# Patient Record
Sex: FEMALE | Race: BLACK OR AFRICAN AMERICAN | NOT HISPANIC OR LATINO | Employment: FULL TIME | URBAN - METROPOLITAN AREA
[De-identification: names, ages, dates, MRNs, and addresses within clinical notes are randomized per-mention and may not be internally consistent; named-entity substitution may affect disease eponyms.]

---

## 2018-11-11 ENCOUNTER — HOSPITAL ENCOUNTER (OUTPATIENT)
Dept: RADIOLOGY | Facility: MEDICAL CENTER | Age: 23
End: 2018-11-11
Attending: FAMILY MEDICINE
Payer: COMMERCIAL

## 2018-11-11 ENCOUNTER — OFFICE VISIT (OUTPATIENT)
Dept: URGENT CARE | Facility: PHYSICIAN GROUP | Age: 23
End: 2018-11-11
Payer: COMMERCIAL

## 2018-11-11 VITALS
DIASTOLIC BLOOD PRESSURE: 62 MMHG | OXYGEN SATURATION: 97 % | TEMPERATURE: 98 F | HEART RATE: 80 BPM | SYSTOLIC BLOOD PRESSURE: 110 MMHG | BODY MASS INDEX: 18.78 KG/M2 | HEIGHT: 64 IN | WEIGHT: 110 LBS

## 2018-11-11 DIAGNOSIS — G43.809 OTHER MIGRAINE WITHOUT STATUS MIGRAINOSUS, NOT INTRACTABLE: ICD-10-CM

## 2018-11-11 LAB
APPEARANCE UR: CLEAR
BILIRUB UR STRIP-MCNC: NORMAL MG/DL
COLOR UR AUTO: YELLOW
GLUCOSE UR STRIP.AUTO-MCNC: NORMAL MG/DL
INT CON NEG: NORMAL
INT CON POS: NORMAL
KETONES UR STRIP.AUTO-MCNC: NORMAL MG/DL
LEUKOCYTE ESTERASE UR QL STRIP.AUTO: NORMAL
NITRITE UR QL STRIP.AUTO: NORMAL
PH UR STRIP.AUTO: 5.5 [PH] (ref 5–8)
POC URINE PREGNANCY TEST: NEGATIVE
PROT UR QL STRIP: NORMAL MG/DL
RBC UR QL AUTO: NORMAL
SP GR UR STRIP.AUTO: 1.02
UROBILINOGEN UR STRIP-MCNC: 0.2 MG/DL

## 2018-11-11 PROCEDURE — 81002 URINALYSIS NONAUTO W/O SCOPE: CPT | Performed by: FAMILY MEDICINE

## 2018-11-11 PROCEDURE — 70450 CT HEAD/BRAIN W/O DYE: CPT

## 2018-11-11 PROCEDURE — 99204 OFFICE O/P NEW MOD 45 MIN: CPT | Performed by: FAMILY MEDICINE

## 2018-11-11 PROCEDURE — 81025 URINE PREGNANCY TEST: CPT | Performed by: FAMILY MEDICINE

## 2018-11-11 RX ORDER — BUTALBITAL, ACETAMINOPHEN AND CAFFEINE 300; 40; 50 MG/1; MG/1; MG/1
1 CAPSULE ORAL EVERY 4 HOURS PRN
Qty: 20 CAP | Refills: 0 | Status: SHIPPED | OUTPATIENT
Start: 2018-11-11 | End: 2018-11-18

## 2018-11-14 NOTE — PROGRESS NOTES
"Chief Complaint   Patient presents with   • Headache     x2wks L side temple              Migraine   This is a new problem. The current episode started in the past 7 days. The problem occurs intermittently. The problem has been unchanged. The pain is located in the   left temple region. The pain does not radiate. The pain quality is similar to prior headaches. The quality of the pain is described as sharp. Associated symptoms include dizziness, nausea, phonophobia and photophobia. Pertinent negatives include no abdominal pain or fever. The symptoms are aggravated by activity and bright light. Patient has tried acetaminophen for the symptoms. The treatment provided no relief.  past medical history is significant for migraine headaches.        Past medical history was unremarkable and not pertinent to current issue  Social hx - denies tobacco, alcohol, drug use  Family hx was reviewed - no pertinent past family hx        Review of Systems   Constitutional: Negative for fever and chills.   Eyes: Positive for photophobia.   Denies blurry vision  Respiratory: Negative for shortness of breath, cough.    Cardiovascular: Negative for chest pain and palpitations.   Gastrointestinal: Positive for nausea. Negative for abdominal pain.   Skin: Negative for rash or itching    - denies dysuria, discharge.   Neurological: Positive for dizziness and headaches.   Psychiatric/Behavioral: The patient is not nervous/anxious.    10 point ROS otherwise negative, except per HPI           Objective:     Blood pressure 110/62, pulse 80, temperature 36.7 °C (98 °F), temperature source Temporal, height 1.626 m (5' 4\"), weight 49.9 kg (110 lb), SpO2 97 %.    Physical Exam   Constitutional: pt is oriented to person, place, and time.  Pt appears well-developed and well-nourished. No distress.   HENT:   Head: Normocephalic and atraumatic.   Mouth/Throat: Oropharynx is clear and moist. No oropharyngeal exudate.   Eyes: Conjunctivae and EOM are " normal. Pupils are equal, round, and reactive to light. Right eye exhibits no discharge. Left eye exhibits no discharge. No scleral icterus.   Neck: Neck supple.   Cardiovascular: Normal rate and regular rhythm.    Pulmonary/Chest: Effort normal.   Lymphadenopathy:     Pt has no cervical adenopathy.   Neurologic: Alert and oriented. Cranial nerves II-XII intact, EOMs intact, no tongue deviation, PERRL, no facial asymmetry to motor or sensation, symmetric palate, normal finger-to-nose test, no pronator drift. No focal motor deficits. Symmetric reflexes. Normal station and gait, normal tandem walk. Coordination normal.   Skin: Skin is warm. Pt is not diaphoretic. No erythema. No pallor.   Psychiatric:  behavior is normal.   Nursing note and vitals reviewed.           11/11/2018 11:21 AM    HISTORY/REASON FOR EXAM:  Severe headache    TECHNIQUE/EXAM DESCRIPTION AND NUMBER OF VIEWS:  CT of the head without contrast.    Up to date radiation dose reduction adjustments have been utilized to meet ALARA standards for radiation dose reduction.    COMPARISON: None available    FINDINGS: There is no evidence of mass or mass effect. CSF spaces are normal. There is no periventricular chronic small vessel ischemic change present. There is no intracranial hemorrhage seen. The calvarium is intact. There is no scalp injury. There is   no evidence of sinus disease.     Impression       No evidence of acute intracranial process.   Reading Provider Reading Date   Clay Burnette M.D. Nov 11, 2018   Signing Provider Signing Date Signing Time   Clay Burnette M.D. Nov 11, 2018 11:52 AM            Assessment/Plan:         1. Other migraine without status migrainosus, not intractable  CT personally reviewed.   Completely unremarkable.       Urine pregnancy negative.     - acetaminophen/caffeine/butalbital 300-40-50 mg (FIORICET) -40 MG Cap capsule; Take 1 Cap by mouth every four hours as needed for Headache for up to 7 days.  Dispense:  20 Cap; Refill: 0    Follow up in 1-2 d if no improvement

## 2019-06-25 ENCOUNTER — TELEPHONE (OUTPATIENT)
Dept: SCHEDULING | Facility: IMAGING CENTER | Age: 24
End: 2019-06-25

## 2023-09-11 ENCOUNTER — OFFICE VISIT (OUTPATIENT)
Dept: URGENT CARE | Facility: PHYSICIAN GROUP | Age: 28
End: 2023-09-11
Payer: COMMERCIAL

## 2023-09-11 VITALS
HEART RATE: 75 BPM | WEIGHT: 113 LBS | DIASTOLIC BLOOD PRESSURE: 60 MMHG | BODY MASS INDEX: 19.29 KG/M2 | RESPIRATION RATE: 18 BRPM | SYSTOLIC BLOOD PRESSURE: 114 MMHG | HEIGHT: 64 IN | TEMPERATURE: 97.1 F | OXYGEN SATURATION: 98 %

## 2023-09-11 DIAGNOSIS — J02.0 STREP PHARYNGITIS: ICD-10-CM

## 2023-09-11 DIAGNOSIS — J02.9 SORE THROAT: ICD-10-CM

## 2023-09-11 LAB — S PYO DNA SPEC NAA+PROBE: DETECTED

## 2023-09-11 PROCEDURE — 3078F DIAST BP <80 MM HG: CPT

## 2023-09-11 PROCEDURE — 3074F SYST BP LT 130 MM HG: CPT

## 2023-09-11 PROCEDURE — 99203 OFFICE O/P NEW LOW 30 MIN: CPT

## 2023-09-11 PROCEDURE — 87651 STREP A DNA AMP PROBE: CPT

## 2023-09-11 RX ORDER — AMOXICILLIN 500 MG/1
500 CAPSULE ORAL 2 TIMES DAILY
Qty: 20 CAPSULE | Refills: 0 | Status: SHIPPED | OUTPATIENT
Start: 2023-09-11 | End: 2023-12-28

## 2023-09-11 NOTE — PROGRESS NOTES
"Chief Complaint   Patient presents with    Pharyngitis     X 2 days sore throat       HISTORY OF PRESENT ILLNESS: Patient is a pleasant 28 y.o. female who presents to urgent care today sore throat for the last 2 days.  Patient denies any fevers.  She took Tylenol this morning notes very little relief.  Patient denies any other symptoms.    There are no problems to display for this patient.      Allergies:Patient has no known allergies.    Current Outpatient Medications Ordered in Epic   Medication Sig Dispense Refill    amoxicillin (AMOXIL) 500 MG Cap Take 1 Capsule by mouth 2 times a day. 20 Capsule 0     No current Epic-ordered facility-administered medications on file.       History reviewed. No pertinent past medical history.    Social History     Tobacco Use    Smoking status: Former     Types: Cigars    Smokeless tobacco: Former   Vaping Use    Vaping Use: Never used   Substance Use Topics    Alcohol use: Yes     Comment: social    Drug use: Never       No family status information on file.   History reviewed. No pertinent family history.    ROS:  Review of Systems   Constitutional: Negative for fever, chills, weight loss, malaise, and fatigue.   HENT: Negative for ear pain, nosebleeds, congestion, positive sore throat and negative neck pain.    Eyes: Negative for vision changes.   Neuro: Negative for headache, sensory changes, weakness, seizure, LOC.   Cardiovascular: Negative for chest pain, palpitations, orthopnea and leg swelling.   Respiratory: Negative for cough, sputum production, shortness of breath and wheezing.   Musculoskeletal: Negative for falls, neck pain, back pain, joint pain, myalgias.   Skin: Negative for rash, diaphoresis.     Exam:  /60 (BP Location: Left arm, Patient Position: Sitting, BP Cuff Size: Adult)   Pulse 75   Temp 36.2 °C (97.1 °F) (Temporal)   Resp 18   Ht 1.626 m (5' 4\")   Wt 51.3 kg (113 lb)   SpO2 98%   General: well-nourished, well-developed female in NAD  Head: " normocephalic, atraumatic  Eyes: PERRLA, no conjunctival injection, acuity grossly intact, lids normal.  Ears: normal shape and symmetry, no tenderness, no discharge. External canals are without any significant edema or erythema. Tympanic membranes are without any inflammation, no effusion. Gross auditory acuity is intact.  Nose: symmetrical without tenderness, no discharge.  Mouth/Throat: reasonable hygiene, positive erythema, negative exudates and 3+ tonsillar enlargement.  Uvula is midline  Neck: no masses, range of motion within normal limits, no tracheal deviation. No obvious thyroid enlargement.   Lymph: no cervical adenopathy. No supraclavicular adenopathy.   Neuro: alert and oriented.. No sensory deficit.   Cardiovascular: regular rate and rhythm. No edema.  Pulmonary: no distress. Chest is symmetrical with respiration, no wheezes, crackles, or rhonchi.   Musculoskeletal: no clubbing, appropriate muscle tone, gait is stable.  Skin: warm, dry, intact, no clubbing, no cyanosis, no rashes.   Psych: appropriate mood, affect, judgement.       Assessment/Plan:  1. Sore throat  POCT GROUP A STREP, PCR    amoxicillin (AMOXIL) 500 MG Cap      2. Strep pharyngitis  amoxicillin (AMOXIL) 500 MG Cap      Patient comes in with complaints of a sore throat for the last 2 days.  Taking OTC medications with little to no relief.  On physical exam it is noted patient has erythremia, no exudate and 3+ tonsillar enlargement, uvula is midline.  POCT strep complete to rule out potential causes.  Strep positive, amoxicillin sent to the pharmacy.  Patient notified via weendyt, I attempted to call the patient, she did not , did not leave a message due to privacy issues.  Advised the use of Motrin and Tylenol for any ongoing discomfort, vitamin C, D.  Patient is aware of the plan of care and agreeable at this time, advised they follow-up if they continue to get worse or do not improve. Total time spent with the patient 14  minutes to include, review of chart, charting, assessment, procedure.     Supportive care, differential diagnoses, and indications for immediate follow-up discussed with patient.   Pathogenesis of diagnosis discussed including typical length and natural progression.   Instructed to return to clinic or nearest emergency department for any change in condition, further concerns, or worsening of symptoms.  Patient states understanding of the plan of care and discharge instructions.  Instructed to make an appointment, for follow up, with primary care provider.        Please note that this dictation was created using voice recognition software. I have made every reasonable attempt to correct obvious errors, but I expect that there are errors of grammar and possibly content that I did not discover before finalizing the note.      Sonali MIRANDA

## 2023-09-27 ENCOUNTER — HOSPITAL ENCOUNTER (OUTPATIENT)
Facility: MEDICAL CENTER | Age: 28
End: 2023-09-27
Attending: PHYSICIAN ASSISTANT
Payer: COMMERCIAL

## 2023-09-27 ENCOUNTER — OFFICE VISIT (OUTPATIENT)
Dept: URGENT CARE | Facility: PHYSICIAN GROUP | Age: 28
End: 2023-09-27
Payer: COMMERCIAL

## 2023-09-27 ENCOUNTER — APPOINTMENT (OUTPATIENT)
Dept: URGENT CARE | Facility: PHYSICIAN GROUP | Age: 28
End: 2023-09-27
Payer: COMMERCIAL

## 2023-09-27 VITALS
WEIGHT: 113 LBS | BODY MASS INDEX: 19.29 KG/M2 | DIASTOLIC BLOOD PRESSURE: 68 MMHG | RESPIRATION RATE: 18 BRPM | HEART RATE: 66 BPM | TEMPERATURE: 97.9 F | HEIGHT: 64 IN | SYSTOLIC BLOOD PRESSURE: 112 MMHG | OXYGEN SATURATION: 99 %

## 2023-09-27 DIAGNOSIS — Z72.51 HIGH RISK HETEROSEXUAL BEHAVIOR: ICD-10-CM

## 2023-09-27 DIAGNOSIS — N76.0 ACUTE VAGINITIS: ICD-10-CM

## 2023-09-27 LAB
AMBIGUOUS DTTM AMBI4: NORMAL
APPEARANCE UR: CLEAR
BILIRUB UR STRIP-MCNC: NORMAL MG/DL
COLOR UR AUTO: YELLOW
GLUCOSE UR STRIP.AUTO-MCNC: NORMAL MG/DL
KETONES UR STRIP.AUTO-MCNC: NORMAL MG/DL
LEUKOCYTE ESTERASE UR QL STRIP.AUTO: NORMAL
NITRITE UR QL STRIP.AUTO: NORMAL
PH UR STRIP.AUTO: 6 [PH] (ref 5–8)
POCT INT CON NEG: NEGATIVE
POCT INT CON POS: POSITIVE
POCT URINE PREGNANCY TEST: NEGATIVE
PROT UR QL STRIP: NORMAL MG/DL
RBC UR QL AUTO: NORMAL
SP GR UR STRIP.AUTO: <=1.005
UROBILINOGEN UR STRIP-MCNC: 0.2 MG/DL

## 2023-09-27 PROCEDURE — 81002 URINALYSIS NONAUTO W/O SCOPE: CPT | Performed by: PHYSICIAN ASSISTANT

## 2023-09-27 PROCEDURE — 87510 GARDNER VAG DNA DIR PROBE: CPT

## 2023-09-27 PROCEDURE — 81025 URINE PREGNANCY TEST: CPT | Performed by: PHYSICIAN ASSISTANT

## 2023-09-27 PROCEDURE — 3078F DIAST BP <80 MM HG: CPT | Performed by: PHYSICIAN ASSISTANT

## 2023-09-27 PROCEDURE — 87480 CANDIDA DNA DIR PROBE: CPT

## 2023-09-27 PROCEDURE — 87491 CHLMYD TRACH DNA AMP PROBE: CPT

## 2023-09-27 PROCEDURE — 3074F SYST BP LT 130 MM HG: CPT | Performed by: PHYSICIAN ASSISTANT

## 2023-09-27 PROCEDURE — 87660 TRICHOMONAS VAGIN DIR PROBE: CPT

## 2023-09-27 PROCEDURE — 99213 OFFICE O/P EST LOW 20 MIN: CPT | Performed by: PHYSICIAN ASSISTANT

## 2023-09-27 PROCEDURE — 87591 N.GONORRHOEAE DNA AMP PROB: CPT

## 2023-09-27 RX ORDER — FLUCONAZOLE 150 MG/1
150 TABLET ORAL ONCE
Qty: 2 TABLET | Refills: 0 | Status: SHIPPED | OUTPATIENT
Start: 2023-09-27 | End: 2023-09-27

## 2023-09-27 ASSESSMENT — ENCOUNTER SYMPTOMS
VOMITING: 0
FEVER: 0
ABDOMINAL PAIN: 0
SORE THROAT: 0
NAUSEA: 0
FLANK PAIN: 0
CHILLS: 0

## 2023-09-27 NOTE — PROGRESS NOTES
"Subjective     Georgie Johnson is a 28 y.o. female who presents with Sexually Transmitted Diseases      HPI:  Georgie Johnson is a 28 y.o. female who presents today for evaluation of possible STD.  Patient notes that she has recently been sexually active with a new male sexual partner.  She has had 2 sexual encounters with this partner over the past few weeks.  She says the first time was prior to her coming in for treatment for strep on 9/11.  She was prescribed a 10-day course of amoxicillin.  She says that when she was feeling better she had sex again with the same partner and since then she has noticed some increase in vaginal discharge as well as vaginal itching.  She says the discharge is thick and \"clumpy\".  She has not had any burning with urination, urgency/frequency.  No fever/chills or joint pain.  Her partner is not having any symptoms that she is aware of.  She would like to be screened for STDs.        Review of Systems   Constitutional:  Negative for chills and fever.   HENT:  Negative for sore throat.    Gastrointestinal:  Negative for abdominal pain, nausea and vomiting.   Genitourinary:  Negative for dysuria, flank pain, frequency, hematuria and urgency.        Abnormal vaginal discharge/itching   Musculoskeletal:  Negative for joint pain.           PMH:  has no past medical history on file.  MEDS:   Current Outpatient Medications:     fluconazole (DIFLUCAN) 150 MG tablet, Take 1 Tablet by mouth one time for 1 dose. May take 1 additional tablet if symptoms persist after 72 hours., Disp: 2 Tablet, Rfl: 0    amoxicillin (AMOXIL) 500 MG Cap, Take 1 Capsule by mouth 2 times a day. (Patient not taking: Reported on 9/27/2023), Disp: 20 Capsule, Rfl: 0  ALLERGIES: No Known Allergies  SURGHX: No past surgical history on file.  SOCHX:  reports that she has quit smoking. Her smoking use included cigars. She has quit using smokeless tobacco. She reports current alcohol use. She reports that she does not use " "drugs.  FH: Family history was reviewed, no pertinent findings to report      Objective     /68   Pulse 66   Temp 36.6 °C (97.9 °F)   Resp 18   Ht 1.626 m (5' 4\")   Wt 51.3 kg (113 lb)   SpO2 99%   BMI 19.40 kg/m²      Physical Exam  Constitutional:       Appearance: She is well-developed.   HENT:      Head: Normocephalic and atraumatic.      Right Ear: External ear normal.      Left Ear: External ear normal.   Eyes:      Conjunctiva/sclera: Conjunctivae normal.      Pupils: Pupils are equal, round, and reactive to light.   Cardiovascular:      Rate and Rhythm: Normal rate and regular rhythm.      Heart sounds: Normal heart sounds. No murmur heard.  Pulmonary:      Effort: Pulmonary effort is normal.      Breath sounds: Normal breath sounds. No wheezing.   Genitourinary:     Comments:  exam deferred. She did a self swab.  Musculoskeletal:      Cervical back: Normal range of motion.   Lymphadenopathy:      Cervical: No cervical adenopathy.   Skin:     General: Skin is warm and dry.      Capillary Refill: Capillary refill takes less than 2 seconds.   Neurological:      Mental Status: She is alert and oriented to person, place, and time.   Psychiatric:         Behavior: Behavior normal.         Judgment: Judgment normal.       POCT Urinalysis  Lab Results   Component Value Date/Time    POCCOLOR Yellow 09/27/2023 03:28 PM    POCAPPEAR Clear 09/27/2023 03:28 PM    POCLEUKEST Neg 09/27/2023 03:28 PM    POCNITRITE Neg 09/27/2023 03:28 PM    POCUROBILIGE 0.2 09/27/2023 03:28 PM    POCPROTEIN Neg 09/27/2023 03:28 PM    POCURPH 6.0 09/27/2023 03:28 PM    POCBLOOD Trace-intact 09/27/2023 03:28 PM    POCSPGRV <=1.005 09/27/2023 03:28 PM    POCKETONES Neg 09/27/2023 03:28 PM    POCBILIRUBIN Neg 09/27/2023 03:28 PM    POCGLUCUA Neg 09/27/2023 03:28 PM        POCT Pregnancy - Negative    Assessment & Plan     1. Acute vaginitis  - fluconazole (DIFLUCAN) 150 MG tablet; Take 1 Tablet by mouth one time for 1 dose. May " take 1 additional tablet if symptoms persist after 72 hours.  Dispense: 2 Tablet; Refill: 0  - POCT Urinalysis  - POCT Pregnancy  - VAGINAL PATHOGENS DNA PANEL; Future  - Chlamydia/GC, PCR (Genital/Anal swab); Future    2. High risk heterosexual behavior  - POCT Pregnancy  - VAGINAL PATHOGENS DNA PANEL; Future  - Chlamydia/GC, PCR (Genital/Anal swab); Future    Symptoms seem highly consistent with yeast vaginitis.  This would also correlate with her recent antibiotic use.  We will initiate treatment with fluconazole while awaiting the results of the vaginal swabs.  Patient notes that she is going out of town early tomorrow morning and will be gone for approximately 1 week.  She should abstain from sexual activity during this time.  We will notify her via [facility] of results and will send any additional treatment to her pharmacy if needed.          Differential Diagnosis, natural history, and supportive care discussed. Return to the Urgent Care or follow up with your PCP if symptoms fail to resolve, or for any new or worsening symptoms. Emergency room precautions discussed. Patient and/or family appears understanding of information.

## 2023-09-28 LAB
C TRACH DNA GENITAL QL NAA+PROBE: NEGATIVE
CANDIDA DNA VAG QL PROBE+SIG AMP: NEGATIVE
G VAGINALIS DNA VAG QL PROBE+SIG AMP: POSITIVE
N GONORRHOEA DNA GENITAL QL NAA+PROBE: NEGATIVE
SPECIMEN SOURCE: NORMAL
T VAGINALIS DNA VAG QL PROBE+SIG AMP: NEGATIVE

## 2023-09-29 DIAGNOSIS — B96.89 BV (BACTERIAL VAGINOSIS): ICD-10-CM

## 2023-09-29 DIAGNOSIS — N76.0 BV (BACTERIAL VAGINOSIS): ICD-10-CM

## 2023-09-29 RX ORDER — METRONIDAZOLE 500 MG/1
500 TABLET ORAL 2 TIMES DAILY
Qty: 14 TABLET | Refills: 0 | Status: SHIPPED | OUTPATIENT
Start: 2023-09-29 | End: 2023-10-14

## 2023-10-13 ENCOUNTER — OFFICE VISIT (OUTPATIENT)
Dept: URGENT CARE | Facility: CLINIC | Age: 28
End: 2023-10-13
Payer: COMMERCIAL

## 2023-10-13 ENCOUNTER — HOSPITAL ENCOUNTER (OUTPATIENT)
Facility: MEDICAL CENTER | Age: 28
End: 2023-10-13
Payer: COMMERCIAL

## 2023-10-13 VITALS
RESPIRATION RATE: 14 BRPM | BODY MASS INDEX: 19.12 KG/M2 | DIASTOLIC BLOOD PRESSURE: 58 MMHG | HEIGHT: 64 IN | TEMPERATURE: 98.5 F | OXYGEN SATURATION: 97 % | HEART RATE: 82 BPM | SYSTOLIC BLOOD PRESSURE: 100 MMHG | WEIGHT: 112 LBS

## 2023-10-13 DIAGNOSIS — N76.0 ACUTE VAGINITIS: ICD-10-CM

## 2023-10-13 PROCEDURE — 3078F DIAST BP <80 MM HG: CPT

## 2023-10-13 PROCEDURE — 99213 OFFICE O/P EST LOW 20 MIN: CPT

## 2023-10-13 PROCEDURE — 87480 CANDIDA DNA DIR PROBE: CPT

## 2023-10-13 PROCEDURE — 87510 GARDNER VAG DNA DIR PROBE: CPT

## 2023-10-13 PROCEDURE — 3074F SYST BP LT 130 MM HG: CPT

## 2023-10-13 PROCEDURE — 87660 TRICHOMONAS VAGIN DIR PROBE: CPT

## 2023-10-13 ASSESSMENT — ENCOUNTER SYMPTOMS
FEVER: 0
SHORTNESS OF BREATH: 0
ABDOMINAL PAIN: 0
VOMITING: 0
NAUSEA: 0

## 2023-10-13 NOTE — PROGRESS NOTES
"Subjective:     CHIEF COMPLAINT  Chief Complaint   Patient presents with    Vaginal Itching     Sx 3 days possible BV / itchiness        HPI  Georgie Johnson is a very pleasant 28 y.o. female who presents with 3 days of vaginal irritation, changes in vaginal discharge, and discomfort.  She had bacterial vaginosis at the end of September and completed her course of metronidazole as prescribed.  She reports that her symptoms resolved completely for several days, but then reoccurred.  She is not experiencing any painful urination and does not have any concern for STDs at this time.    REVIEW OF SYSTEMS  Review of Systems   Constitutional:  Negative for fever.   Respiratory:  Negative for shortness of breath.    Cardiovascular:  Negative for chest pain.   Gastrointestinal:  Negative for abdominal pain, nausea and vomiting.   Genitourinary:  Negative for dysuria, frequency, hematuria and urgency.       PAST MEDICAL HISTORY  There are no problems to display for this patient.      SURGICAL HISTORY  patient denies any surgical history    ALLERGIES  No Known Allergies    CURRENT MEDICATIONS  Home Medications       Reviewed by Lisa Khan P.A.-C. (Physician Assistant) on 10/13/23 at 1433  Med List Status: <None>     Medication Last Dose Status   amoxicillin (AMOXIL) 500 MG Cap  Active   metroNIDAZOLE (FLAGYL) 500 MG Tab Not Taking Active                    SOCIAL HISTORY  Social History     Tobacco Use    Smoking status: Former     Types: Cigars    Smokeless tobacco: Former   Vaping Use    Vaping Use: Never used   Substance and Sexual Activity    Alcohol use: Yes     Comment: social    Drug use: Never    Sexual activity: Not on file       FAMILY HISTORY  History reviewed. No pertinent family history.       Objective:     VITAL SIGNS: /58 (BP Location: Left arm, Patient Position: Sitting, BP Cuff Size: Adult)   Pulse 82   Temp 36.9 °C (98.5 °F) (Temporal)   Resp 14   Ht 1.626 m (5' 4\")   Wt 50.8 kg (112 lb)  "  SpO2 97%   BMI 19.22 kg/m²     PHYSICAL EXAM  Physical Exam  Vitals reviewed.   Constitutional:       General: She is not in acute distress.     Appearance: Normal appearance. She is not ill-appearing or toxic-appearing.   HENT:      Head: Normocephalic and atraumatic.      Nose: Nose normal.      Mouth/Throat:      Mouth: Mucous membranes are moist.   Eyes:      Conjunctiva/sclera: Conjunctivae normal.   Cardiovascular:      Rate and Rhythm: Normal rate.   Pulmonary:      Effort: Pulmonary effort is normal. No respiratory distress.   Skin:     General: Skin is warm and dry.   Neurological:      General: No focal deficit present.      Mental Status: She is alert.   Psychiatric:         Mood and Affect: Mood normal.         Assessment/Plan:     1. Acute vaginitis  - VAGINAL PATHOGENS DNA PANEL; Future  -Follow-up with PCP  -Avoid douching  -Avoid scented soaps on vaginal area  -Return to clinic if symptoms worsen or fail to resolve    MDM/Comments:  Patient has stable vital signs and is non-toxic appearing. Discussed supportive care with avoidance of scented soaps in the vaginal area and avoidance of douching.  Vaginal pathogen swab ordered with results pending.  Patient is concerned that she may either have bacterial vaginosis or yeast infection, so we will defer treatment until we have definitive answers.  Discussed that if bacterial vaginosis comes a recurrent issue, she may benefit from seeing a gynecologist. Patient demonstrated understanding of treatment plan at this time and will RTC if symptoms worsen or fail to resolve.     Differential diagnosis, natural history, supportive care, and indications for immediate follow-up discussed. All questions answered. Patient agrees with the plan of care.    Follow-up as needed if symptoms worsen or fail to improve to PCP, Urgent care or Emergency Room.    I have personally reviewed prior external notes and test results pertinent to today's visit.  I have independently  reviewed and interpreted all diagnostics ordered during this urgent care acute visit.   Discussed management options (risks,benefits, and alternatives to treatment). Pt expresses understanding and the treatment plan was agreed upon. Questions were encouraged and answered to pt's satisfaction.    Please note that this dictation was created using voice recognition software. I have made a reasonable attempt to correct obvious errors, but I expect that there are errors of grammar and possibly content that I did not discover before finalizing the note.

## 2023-10-14 DIAGNOSIS — B96.89 BV (BACTERIAL VAGINOSIS): ICD-10-CM

## 2023-10-14 DIAGNOSIS — N76.0 BV (BACTERIAL VAGINOSIS): ICD-10-CM

## 2023-10-14 DIAGNOSIS — B37.31 CANDIDA VAGINITIS: ICD-10-CM

## 2023-10-14 LAB
CANDIDA DNA VAG QL PROBE+SIG AMP: POSITIVE
G VAGINALIS DNA VAG QL PROBE+SIG AMP: POSITIVE
T VAGINALIS DNA VAG QL PROBE+SIG AMP: NEGATIVE

## 2023-10-14 RX ORDER — FLUCONAZOLE 100 MG/1
100 TABLET ORAL DAILY
Qty: 1 TABLET | Refills: 0 | Status: SHIPPED | OUTPATIENT
Start: 2023-10-14 | End: 2023-10-15

## 2023-10-14 RX ORDER — METRONIDAZOLE 500 MG/1
500 TABLET ORAL 2 TIMES DAILY
Qty: 14 TABLET | Refills: 0 | Status: SHIPPED | OUTPATIENT
Start: 2023-10-14 | End: 2023-10-21

## 2023-10-14 NOTE — PROGRESS NOTES
Patient called 10/14/23 at 2:30PM and informed of positive vaginal pathogens results for candida and BV.

## 2023-12-28 ENCOUNTER — OFFICE VISIT (OUTPATIENT)
Dept: URGENT CARE | Facility: CLINIC | Age: 28
End: 2023-12-28
Payer: COMMERCIAL

## 2023-12-28 VITALS
HEIGHT: 64 IN | RESPIRATION RATE: 12 BRPM | SYSTOLIC BLOOD PRESSURE: 102 MMHG | BODY MASS INDEX: 18.78 KG/M2 | DIASTOLIC BLOOD PRESSURE: 56 MMHG | HEART RATE: 82 BPM | WEIGHT: 110 LBS | OXYGEN SATURATION: 100 % | TEMPERATURE: 97.9 F

## 2023-12-28 DIAGNOSIS — J22 LRTI (LOWER RESPIRATORY TRACT INFECTION): ICD-10-CM

## 2023-12-28 PROCEDURE — 3078F DIAST BP <80 MM HG: CPT | Performed by: FAMILY MEDICINE

## 2023-12-28 PROCEDURE — 99213 OFFICE O/P EST LOW 20 MIN: CPT | Performed by: FAMILY MEDICINE

## 2023-12-28 PROCEDURE — 3074F SYST BP LT 130 MM HG: CPT | Performed by: FAMILY MEDICINE

## 2023-12-28 RX ORDER — DEXAMETHASONE 6 MG/1
6 TABLET ORAL DAILY
Qty: 3 TABLET | Refills: 0 | Status: SHIPPED | OUTPATIENT
Start: 2023-12-28

## 2023-12-28 RX ORDER — BENZONATATE 200 MG/1
200 CAPSULE ORAL 3 TIMES DAILY PRN
Qty: 30 CAPSULE | Refills: 0 | Status: SHIPPED | OUTPATIENT
Start: 2023-12-28

## 2023-12-28 RX ORDER — AZITHROMYCIN 250 MG/1
TABLET, FILM COATED ORAL
Qty: 6 TABLET | Refills: 0 | Status: SHIPPED | OUTPATIENT
Start: 2023-12-28

## 2023-12-28 RX ORDER — ALBUTEROL SULFATE 90 UG/1
2 AEROSOL, METERED RESPIRATORY (INHALATION) EVERY 6 HOURS PRN
Qty: 8.5 G | Refills: 0 | Status: SHIPPED | OUTPATIENT
Start: 2023-12-28

## 2023-12-28 ASSESSMENT — ENCOUNTER SYMPTOMS: COUGH: 1

## 2023-12-28 NOTE — PROGRESS NOTES
"Subjective     Georgie Johnson is a 28 y.o. female who presents with Cough (X2 wks)    - This is a very pleasant 28 y.o. who has come to the walk-in clinic today for 2wks w/ cough and non bloody sputum. Worse at night. No nvfc/cp.sob          ALLERGIES:  Patient has no known allergies.     PMH:  History reviewed. No pertinent past medical history.     PSH:  History reviewed. No pertinent surgical history.    MEDS:    Current Outpatient Medications:     azithromycin (ZITHROMAX) 250 MG Tab, Use as directed, Disp: 6 Tablet, Rfl: 0    benzonatate (TESSALON) 200 MG capsule, Take 1 Capsule by mouth 3 times a day as needed for Cough., Disp: 30 Capsule, Rfl: 0    dexamethasone (DECADRON) 6 MG Tab, Take 1 Tablet by mouth every day., Disp: 3 Tablet, Rfl: 0    albuterol (PROAIR HFA) 108 (90 Base) MCG/ACT Aero Soln inhalation aerosol, Inhale 2 Puffs every 6 hours as needed (cough/wheeze)., Disp: 8.5 g, Rfl: 0    ** I have documented what I find to be significant in regards to past medical, social, family and surgical history  in my HPI or under PMH/PSH/FH review section, otherwise it is noncontributory **           HPI    Review of Systems   Respiratory:  Positive for cough.    All other systems reviewed and are negative.             Objective     /56 (BP Location: Left arm, Patient Position: Sitting, BP Cuff Size: Adult)   Pulse 82   Temp 36.6 °C (97.9 °F) (Temporal)   Resp 12   Ht 1.626 m (5' 4\")   Wt 49.9 kg (110 lb)   SpO2 100%   BMI 18.88 kg/m²      Physical Exam  Vitals and nursing note reviewed.   Constitutional:       General: She is not in acute distress.     Appearance: Normal appearance. She is well-developed.   HENT:      Head: Normocephalic.      Mouth/Throat:      Mouth: Mucous membranes are moist.      Pharynx: Oropharynx is clear. No oropharyngeal exudate or posterior oropharyngeal erythema.   Cardiovascular:      Heart sounds: Normal heart sounds. No murmur heard.  Pulmonary:      Effort: Pulmonary " effort is normal. No respiratory distress.      Breath sounds: Wheezing (faint end exp) present. No rhonchi or rales.   Neurological:      Mental Status: She is alert.      Motor: No abnormal muscle tone.   Psychiatric:         Mood and Affect: Mood normal.         Behavior: Behavior normal.                             Assessment & Plan     1. LRTI (lower respiratory tract infection)  azithromycin (ZITHROMAX) 250 MG Tab    benzonatate (TESSALON) 200 MG capsule    dexamethasone (DECADRON) 6 MG Tab    albuterol (PROAIR HFA) 108 (90 Base) MCG/ACT Aero Soln inhalation aerosol          - Dx, plan & d/c instructions discussed   - Rest, stay hydrated      Follow up with your regular primary care providers office within a week to keep them updated and informed of this visit and for regular routine health maintenance check-ups. ER if not improving in 2-3 days or if feeling/getting worse. (If you do not have a primary care provider and need to schedule one you may call [facility] at [phone] to do this).    Any realistic side effects of medications that may have been given today reviewed.     Patient left in stable condition         Pertinent prior lab work and/or imaging studies in Epic have been reviewed by me today on day of this visit and taken into account for my treatment and plan today    Pertinent PMH/PSH and/or chronic conditions and medications if any were reviewed today and taken into account for my treatment and plan today    Pertinent prior office visit notes in [facility] have been reviewed by me today on day of this visit.    Please note that this dictation may have been created using voice recognition software, if so I have made every reasonable attempt to correct obvious errors, but I expect that there are errors of grammar and possibly content that I did not discover before finalizing the note.

## 2024-01-19 DIAGNOSIS — J22 LRTI (LOWER RESPIRATORY TRACT INFECTION): ICD-10-CM

## 2024-04-18 RX ORDER — ALBUTEROL SULFATE 90 UG/1
AEROSOL, METERED RESPIRATORY (INHALATION)
Qty: 8.5 G | Refills: 0 | OUTPATIENT
Start: 2024-04-18

## 2024-06-27 ENCOUNTER — HOSPITAL ENCOUNTER (OUTPATIENT)
Facility: MEDICAL CENTER | Age: 29
End: 2024-06-27
Attending: NURSE PRACTITIONER
Payer: COMMERCIAL

## 2024-06-27 ENCOUNTER — OFFICE VISIT (OUTPATIENT)
Dept: MEDICAL GROUP | Facility: PHYSICIAN GROUP | Age: 29
End: 2024-06-27
Payer: COMMERCIAL

## 2024-06-27 VITALS
BODY MASS INDEX: 18.61 KG/M2 | DIASTOLIC BLOOD PRESSURE: 66 MMHG | WEIGHT: 109 LBS | TEMPERATURE: 99.8 F | SYSTOLIC BLOOD PRESSURE: 104 MMHG | OXYGEN SATURATION: 98 % | HEIGHT: 64 IN | HEART RATE: 75 BPM

## 2024-06-27 DIAGNOSIS — N89.8 VAGINAL DISCHARGE: ICD-10-CM

## 2024-06-27 DIAGNOSIS — F32.2 CURRENT SEVERE EPISODE OF MAJOR DEPRESSIVE DISORDER WITHOUT PSYCHOTIC FEATURES WITHOUT PRIOR EPISODE (HCC): ICD-10-CM

## 2024-06-27 DIAGNOSIS — Z76.89 ENCOUNTER TO ESTABLISH CARE: ICD-10-CM

## 2024-06-27 DIAGNOSIS — R31.9 HEMATURIA, UNSPECIFIED TYPE: ICD-10-CM

## 2024-06-27 DIAGNOSIS — Z11.3 SCREEN FOR STD (SEXUALLY TRANSMITTED DISEASE): ICD-10-CM

## 2024-06-27 DIAGNOSIS — Z13.220 SCREENING FOR LIPID DISORDERS: ICD-10-CM

## 2024-06-27 DIAGNOSIS — F41.1 GAD (GENERALIZED ANXIETY DISORDER): ICD-10-CM

## 2024-06-27 LAB
APPEARANCE UR: CLEAR
BILIRUB UR STRIP-MCNC: NEGATIVE MG/DL
COLOR UR AUTO: YELLOW
GLUCOSE UR STRIP.AUTO-MCNC: NEGATIVE MG/DL
KETONES UR STRIP.AUTO-MCNC: NEGATIVE MG/DL
LEUKOCYTE ESTERASE UR QL STRIP.AUTO: NEGATIVE
NITRITE UR QL STRIP.AUTO: NEGATIVE
PH UR STRIP.AUTO: 5.5 [PH] (ref 5–8)
POCT INT CON NEG: NEGATIVE
POCT INT CON POS: POSITIVE
POCT URINE PREGNANCY TEST: NEGATIVE
PROT UR QL STRIP: NEGATIVE MG/DL
RBC UR QL AUTO: NORMAL
SP GR UR STRIP.AUTO: >=1.03
UROBILINOGEN UR STRIP-MCNC: 0.2 MG/DL

## 2024-06-27 PROCEDURE — 87660 TRICHOMONAS VAGIN DIR PROBE: CPT

## 2024-06-27 PROCEDURE — 87480 CANDIDA DNA DIR PROBE: CPT

## 2024-06-27 PROCEDURE — 87510 GARDNER VAG DNA DIR PROBE: CPT

## 2024-06-27 PROCEDURE — 87491 CHLMYD TRACH DNA AMP PROBE: CPT

## 2024-06-27 PROCEDURE — 87591 N.GONORRHOEAE DNA AMP PROB: CPT

## 2024-06-27 PROCEDURE — 87086 URINE CULTURE/COLONY COUNT: CPT

## 2024-06-27 RX ORDER — M-VIT,TX,IRON,MINS/CALC/FOLIC 27MG-0.4MG
1 TABLET ORAL DAILY
COMMUNITY

## 2024-06-27 ASSESSMENT — ANXIETY QUESTIONNAIRES
7. FEELING AFRAID AS IF SOMETHING AWFUL MIGHT HAPPEN: NEARLY EVERY DAY
2. NOT BEING ABLE TO STOP OR CONTROL WORRYING: NEARLY EVERY DAY
4. TROUBLE RELAXING: NEARLY EVERY DAY
GAD7 TOTAL SCORE: 16
IF YOU CHECKED OFF ANY PROBLEMS ON THIS QUESTIONNAIRE, HOW DIFFICULT HAVE THESE PROBLEMS MADE IT FOR YOU TO DO YOUR WORK, TAKE CARE OF THINGS AT HOME, OR GET ALONG WITH OTHER PEOPLE: EXTREMELY DIFFICULT
3. WORRYING TOO MUCH ABOUT DIFFERENT THINGS: NEARLY EVERY DAY
6. BECOMING EASILY ANNOYED OR IRRITABLE: SEVERAL DAYS
1. FEELING NERVOUS, ANXIOUS, OR ON EDGE: MORE THAN HALF THE DAYS
5. BEING SO RESTLESS THAT IT IS HARD TO SIT STILL: SEVERAL DAYS

## 2024-06-27 ASSESSMENT — PATIENT HEALTH QUESTIONNAIRE - PHQ9
5. POOR APPETITE OR OVEREATING: 3 - NEARLY EVERY DAY
SUM OF ALL RESPONSES TO PHQ QUESTIONS 1-9: 22
CLINICAL INTERPRETATION OF PHQ2 SCORE: 6

## 2024-06-27 NOTE — ASSESSMENT & PLAN NOTE
The vaginal discharge started 2-3 days ago. The discharge is white, has odor, thick, and vaginal discomfort. No interventions tried. Denies fevers, chills, dysuria, urinary frequency, nausea, vomiting, hematuria. She is interested in STD screening.
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
## 2024-06-27 NOTE — LETTER
[facility]  BRONSON StewartRJOELLE  [address]  [place] [address]  Fax: [phone]   Authorization for Release/Disclosure of   Protected Health Information   Name: VERONICA MANCUSO : 1995 SSN: [id number]   Address: [address] [place] [address]  [address]  [place] [address] Phone:    There are no phone numbers on file.   I authorize the entity listed below to release/disclose the PHI below to:   [facility]/BRITTANY Stewart and BRITTANY Stewart   Provider or Entity Name:  [facility]    Address   [facility], [facility], [facility]   Phone:  [phone]    Fax:     Reason for request: continuity of care   Information to be released:    [  ] LAST COLONOSCOPY,  including any PATH REPORT and follow-up  [  ] LAST FIT/COLOGUARD RESULT [  ] LAST DEXA  [  ] LAST MAMMOGRAM  [ xxx] LAST PAP  [  ] LAST LABS [  ] RETINA EXAM REPORT  [  ] IMMUNIZATION RECORDS  [xxx ] Release all info      [  ] Check here and initial the line next to each item to release ALL health information INCLUDING  _____ Care and treatment for drug and / or alcohol abuse  _____ HIV testing, infection status, or AIDS  _____ Genetic Testing    DATES OF SERVICE OR TIME PERIOD TO BE DISCLOSED: _____________  I understand and acknowledge that:  * This Authorization may be revoked at any time by you in writing, except if your health information has already been used or disclosed.  * Your health information that will be used or disclosed as a result of you signing this authorization could be re-disclosed by the recipient. If this occurs, your re-disclosed health information may no longer be protected by State or Federal laws.  * You may refuse to sign this Authorization. Your refusal will not affect your ability to obtain treatment.  * This Authorization becomes effective upon signing and will  on (date) __________.      If no date is indicated, this Authorization will  one (1) year from the signature date.     Name: Georgie Enamorado Alex  Signature: Date:   6/27/2024     PLEASE FAX REQUESTED RECORDS BACK TO: [phone]

## 2024-06-28 LAB
CANDIDA DNA VAG QL PROBE+SIG AMP: NEGATIVE
G VAGINALIS DNA VAG QL PROBE+SIG AMP: POSITIVE
T VAGINALIS DNA VAG QL PROBE+SIG AMP: NEGATIVE

## 2024-06-29 LAB
C TRACH DNA SPEC QL NAA+PROBE: NEGATIVE
N GONORRHOEA DNA SPEC QL NAA+PROBE: NEGATIVE
SPECIMEN SOURCE: NORMAL

## 2024-06-30 LAB
BACTERIA UR CULT: NORMAL
SIGNIFICANT IND 70042: NORMAL
SITE SITE: NORMAL
SOURCE SOURCE: NORMAL

## 2024-07-01 DIAGNOSIS — N76.0 BACTERIAL VAGINOSIS: ICD-10-CM

## 2024-07-01 DIAGNOSIS — B96.89 BACTERIAL VAGINOSIS: ICD-10-CM

## 2024-07-01 RX ORDER — METRONIDAZOLE 7.5 MG/G
1 GEL VAGINAL
Qty: 5 EACH | Refills: 0 | Status: SHIPPED | OUTPATIENT
Start: 2024-07-01 | End: 2024-07-06

## 2024-07-05 ENCOUNTER — PATIENT MESSAGE (OUTPATIENT)
Dept: MEDICAL GROUP | Facility: PHYSICIAN GROUP | Age: 29
End: 2024-07-05
Payer: COMMERCIAL

## 2024-07-05 DIAGNOSIS — N76.0 BACTERIAL VAGINOSIS: ICD-10-CM

## 2024-07-05 DIAGNOSIS — B96.89 BACTERIAL VAGINOSIS: ICD-10-CM

## 2024-07-08 RX ORDER — METRONIDAZOLE 500 MG/1
500 TABLET ORAL 2 TIMES DAILY
Qty: 14 TABLET | Refills: 0 | Status: SHIPPED | OUTPATIENT
Start: 2024-07-08 | End: 2024-07-15

## 2024-07-11 ENCOUNTER — HOSPITAL ENCOUNTER (OUTPATIENT)
Dept: LAB | Facility: MEDICAL CENTER | Age: 29
End: 2024-07-11
Attending: NURSE PRACTITIONER
Payer: COMMERCIAL

## 2024-07-11 DIAGNOSIS — F32.2 CURRENT SEVERE EPISODE OF MAJOR DEPRESSIVE DISORDER WITHOUT PSYCHOTIC FEATURES WITHOUT PRIOR EPISODE (HCC): ICD-10-CM

## 2024-07-11 DIAGNOSIS — Z11.3 SCREEN FOR STD (SEXUALLY TRANSMITTED DISEASE): ICD-10-CM

## 2024-07-11 DIAGNOSIS — Z13.220 SCREENING FOR LIPID DISORDERS: ICD-10-CM

## 2024-07-11 DIAGNOSIS — F41.1 GAD (GENERALIZED ANXIETY DISORDER): ICD-10-CM

## 2024-07-11 LAB
25(OH)D3 SERPL-MCNC: 25 NG/ML (ref 30–100)
ALBUMIN SERPL BCP-MCNC: 4.5 G/DL (ref 3.2–4.9)
ALBUMIN/GLOB SERPL: 1.7 G/DL
ALP SERPL-CCNC: 68 U/L (ref 30–99)
ALT SERPL-CCNC: 17 U/L (ref 2–50)
ANION GAP SERPL CALC-SCNC: 12 MMOL/L (ref 7–16)
AST SERPL-CCNC: 18 U/L (ref 12–45)
BASOPHILS # BLD AUTO: 0.9 % (ref 0–1.8)
BASOPHILS # BLD: 0.04 K/UL (ref 0–0.12)
BILIRUB SERPL-MCNC: 0.3 MG/DL (ref 0.1–1.5)
BUN SERPL-MCNC: 6 MG/DL (ref 8–22)
CALCIUM ALBUM COR SERPL-MCNC: 9.3 MG/DL (ref 8.5–10.5)
CALCIUM SERPL-MCNC: 9.7 MG/DL (ref 8.5–10.5)
CHLORIDE SERPL-SCNC: 104 MMOL/L (ref 96–112)
CHOLEST SERPL-MCNC: 189 MG/DL (ref 100–199)
CO2 SERPL-SCNC: 22 MMOL/L (ref 20–33)
CREAT SERPL-MCNC: 0.92 MG/DL (ref 0.5–1.4)
EOSINOPHIL # BLD AUTO: 0.02 K/UL (ref 0–0.51)
EOSINOPHIL NFR BLD: 0.5 % (ref 0–6.9)
ERYTHROCYTE [DISTWIDTH] IN BLOOD BY AUTOMATED COUNT: 49.4 FL (ref 35.9–50)
FASTING STATUS PATIENT QL REPORTED: NORMAL
GFR SERPLBLD CREATININE-BSD FMLA CKD-EPI: 87 ML/MIN/1.73 M 2
GLOBULIN SER CALC-MCNC: 2.6 G/DL (ref 1.9–3.5)
GLUCOSE SERPL-MCNC: 96 MG/DL (ref 65–99)
HCT VFR BLD AUTO: 44.9 % (ref 37–47)
HCV AB SER QL: NORMAL
HDLC SERPL-MCNC: 88 MG/DL
HGB BLD-MCNC: 14.4 G/DL (ref 12–16)
HIV 1+2 AB+HIV1 P24 AG SERPL QL IA: NORMAL
IMM GRANULOCYTES # BLD AUTO: 0 K/UL (ref 0–0.11)
IMM GRANULOCYTES NFR BLD AUTO: 0 % (ref 0–0.9)
LDLC SERPL CALC-MCNC: 88 MG/DL
LYMPHOCYTES # BLD AUTO: 2.1 K/UL (ref 1–4.8)
LYMPHOCYTES NFR BLD: 49.2 % (ref 22–41)
MCH RBC QN AUTO: 28.3 PG (ref 27–33)
MCHC RBC AUTO-ENTMCNC: 32.1 G/DL (ref 32.2–35.5)
MCV RBC AUTO: 88.2 FL (ref 81.4–97.8)
MONOCYTES # BLD AUTO: 0.24 K/UL (ref 0–0.85)
MONOCYTES NFR BLD AUTO: 5.6 % (ref 0–13.4)
NEUTROPHILS # BLD AUTO: 1.87 K/UL (ref 1.82–7.42)
NEUTROPHILS NFR BLD: 43.8 % (ref 44–72)
NRBC # BLD AUTO: 0 K/UL
NRBC BLD-RTO: 0 /100 WBC (ref 0–0.2)
PLATELET # BLD AUTO: 302 K/UL (ref 164–446)
PMV BLD AUTO: 9.2 FL (ref 9–12.9)
POTASSIUM SERPL-SCNC: 4.5 MMOL/L (ref 3.6–5.5)
PROT SERPL-MCNC: 7.1 G/DL (ref 6–8.2)
RBC # BLD AUTO: 5.09 M/UL (ref 4.2–5.4)
SODIUM SERPL-SCNC: 138 MMOL/L (ref 135–145)
T PALLIDUM AB SER QL IA: NORMAL
TRIGL SERPL-MCNC: 64 MG/DL (ref 0–149)
TSH SERPL DL<=0.005 MIU/L-ACNC: 1.14 UIU/ML (ref 0.38–5.33)
VIT B12 SERPL-MCNC: 823 PG/ML (ref 211–911)
WBC # BLD AUTO: 4.3 K/UL (ref 4.8–10.8)

## 2024-07-11 PROCEDURE — 80061 LIPID PANEL: CPT

## 2024-07-11 PROCEDURE — 87389 HIV-1 AG W/HIV-1&-2 AB AG IA: CPT

## 2024-07-11 PROCEDURE — 86803 HEPATITIS C AB TEST: CPT

## 2024-07-11 PROCEDURE — 82306 VITAMIN D 25 HYDROXY: CPT

## 2024-07-11 PROCEDURE — 85025 COMPLETE CBC W/AUTO DIFF WBC: CPT

## 2024-07-11 PROCEDURE — 36415 COLL VENOUS BLD VENIPUNCTURE: CPT

## 2024-07-11 PROCEDURE — 84443 ASSAY THYROID STIM HORMONE: CPT

## 2024-07-11 PROCEDURE — 80053 COMPREHEN METABOLIC PANEL: CPT

## 2024-07-11 PROCEDURE — 82607 VITAMIN B-12: CPT

## 2024-07-11 PROCEDURE — 86780 TREPONEMA PALLIDUM: CPT

## 2024-07-22 ENCOUNTER — OFFICE VISIT (OUTPATIENT)
Dept: MEDICAL GROUP | Facility: PHYSICIAN GROUP | Age: 29
End: 2024-07-22
Payer: COMMERCIAL

## 2024-07-22 VITALS
HEIGHT: 64 IN | BODY MASS INDEX: 20.49 KG/M2 | DIASTOLIC BLOOD PRESSURE: 60 MMHG | WEIGHT: 120 LBS | SYSTOLIC BLOOD PRESSURE: 126 MMHG | HEART RATE: 91 BPM | OXYGEN SATURATION: 99 % | TEMPERATURE: 99 F

## 2024-07-22 DIAGNOSIS — F41.1 GAD (GENERALIZED ANXIETY DISORDER): ICD-10-CM

## 2024-07-22 DIAGNOSIS — F32.2 CURRENT SEVERE EPISODE OF MAJOR DEPRESSIVE DISORDER WITHOUT PSYCHOTIC FEATURES WITHOUT PRIOR EPISODE (HCC): ICD-10-CM

## 2024-07-22 DIAGNOSIS — E55.9 VITAMIN D INSUFFICIENCY: ICD-10-CM

## 2024-07-22 DIAGNOSIS — D70.9 NEUTROPENIA, UNSPECIFIED TYPE (HCC): ICD-10-CM

## 2024-07-22 PROBLEM — N89.8 VAGINAL DISCHARGE: Status: RESOLVED | Noted: 2024-06-27 | Resolved: 2024-07-22

## 2024-07-22 PROCEDURE — 99214 OFFICE O/P EST MOD 30 MIN: CPT | Performed by: NURSE PRACTITIONER

## 2024-07-22 PROCEDURE — 3074F SYST BP LT 130 MM HG: CPT | Performed by: NURSE PRACTITIONER

## 2024-07-22 PROCEDURE — 3078F DIAST BP <80 MM HG: CPT | Performed by: NURSE PRACTITIONER

## 2024-07-22 ASSESSMENT — ANXIETY QUESTIONNAIRES
3. WORRYING TOO MUCH ABOUT DIFFERENT THINGS: NEARLY EVERY DAY
1. FEELING NERVOUS, ANXIOUS, OR ON EDGE: NEARLY EVERY DAY
4. TROUBLE RELAXING: NEARLY EVERY DAY
6. BECOMING EASILY ANNOYED OR IRRITABLE: MORE THAN HALF THE DAYS
7. FEELING AFRAID AS IF SOMETHING AWFUL MIGHT HAPPEN: NEARLY EVERY DAY
5. BEING SO RESTLESS THAT IT IS HARD TO SIT STILL: MORE THAN HALF THE DAYS
GAD7 TOTAL SCORE: 19
2. NOT BEING ABLE TO STOP OR CONTROL WORRYING: NEARLY EVERY DAY

## 2024-07-22 ASSESSMENT — FIBROSIS 4 INDEX: FIB4 SCORE: 0.39

## 2024-07-22 ASSESSMENT — PATIENT HEALTH QUESTIONNAIRE - PHQ9
CLINICAL INTERPRETATION OF PHQ2 SCORE: 1
5. POOR APPETITE OR OVEREATING: 3 - NEARLY EVERY DAY
SUM OF ALL RESPONSES TO PHQ QUESTIONS 1-9: 13

## 2024-10-30 ENCOUNTER — APPOINTMENT (OUTPATIENT)
Dept: MEDICAL GROUP | Facility: PHYSICIAN GROUP | Age: 29
End: 2024-10-30
Payer: COMMERCIAL

## 2024-11-08 ENCOUNTER — OFFICE VISIT (OUTPATIENT)
Dept: URGENT CARE | Facility: CLINIC | Age: 29
End: 2024-11-08
Payer: COMMERCIAL

## 2024-11-08 ENCOUNTER — APPOINTMENT (OUTPATIENT)
Dept: URGENT CARE | Facility: CLINIC | Age: 29
End: 2024-11-08
Payer: COMMERCIAL

## 2024-11-08 VITALS
HEART RATE: 84 BPM | SYSTOLIC BLOOD PRESSURE: 100 MMHG | RESPIRATION RATE: 16 BRPM | OXYGEN SATURATION: 97 % | HEIGHT: 64 IN | TEMPERATURE: 98.2 F | DIASTOLIC BLOOD PRESSURE: 60 MMHG | BODY MASS INDEX: 19.63 KG/M2 | WEIGHT: 115 LBS

## 2024-11-08 DIAGNOSIS — N30.01 ACUTE CYSTITIS WITH HEMATURIA: ICD-10-CM

## 2024-11-08 DIAGNOSIS — R31.9 HEMATURIA, UNSPECIFIED TYPE: ICD-10-CM

## 2024-11-08 LAB
APPEARANCE UR: NORMAL
BILIRUB UR STRIP-MCNC: NEGATIVE MG/DL
COLOR UR AUTO: YELLOW
GLUCOSE UR STRIP.AUTO-MCNC: NEGATIVE MG/DL
KETONES UR STRIP.AUTO-MCNC: NEGATIVE MG/DL
LEUKOCYTE ESTERASE UR QL STRIP.AUTO: NORMAL
NITRITE UR QL STRIP.AUTO: NEGATIVE
PH UR STRIP.AUTO: 6.5 [PH] (ref 5–8)
POCT INT CON NEG: NEGATIVE
POCT INT CON POS: POSITIVE
POCT URINE PREGNANCY TEST: NEGATIVE
PROT UR QL STRIP: 30 MG/DL
RBC UR QL AUTO: NORMAL
SP GR UR STRIP.AUTO: 1.02
UROBILINOGEN UR STRIP-MCNC: 0.2 MG/DL

## 2024-11-08 PROCEDURE — 81002 URINALYSIS NONAUTO W/O SCOPE: CPT

## 2024-11-08 PROCEDURE — 81025 URINE PREGNANCY TEST: CPT

## 2024-11-08 PROCEDURE — 99213 OFFICE O/P EST LOW 20 MIN: CPT

## 2024-11-08 PROCEDURE — 3078F DIAST BP <80 MM HG: CPT

## 2024-11-08 PROCEDURE — 3074F SYST BP LT 130 MM HG: CPT

## 2024-11-08 RX ORDER — NITROFURANTOIN 25; 75 MG/1; MG/1
100 CAPSULE ORAL 2 TIMES DAILY
Qty: 10 CAPSULE | Refills: 0 | Status: SHIPPED | OUTPATIENT
Start: 2024-11-08 | End: 2024-11-13

## 2024-11-08 ASSESSMENT — FIBROSIS 4 INDEX: FIB4 SCORE: 0.39

## 2024-11-08 NOTE — PROGRESS NOTES
"Subjective:   Georgie Johnson is a 29 y.o. female who presents for Urinary Pain (X5days, Urinary pain, blood in urine, )      HPI:      Patient presents urgent care with concerns of urinary tract infection.  Reports 5 days of dysuria, urinary frequency, hematuria, urinary urgency.  Symptoms have been worsening over the week.  She has been trying to treat the infection at home with increased oral hydration, cranberry juice.  She denies fever, chills, flank pain.  Denies vaginal discharge, odor, itching.  Denies concerns for STDs.    ROS As above in HPI    Medications:    No current outpatient medications on file prior to visit.     No current facility-administered medications on file prior to visit.        Allergies:   Patient has no known allergies.    Problem List:   Patient Active Problem List   Diagnosis    SANDEE (generalized anxiety disorder)    Current severe episode of major depressive disorder without psychotic features without prior episode ([facility])    Vitamin D insufficiency        Surgical History:  No past surgical history on file.    Past Social Hx:   Social History     Tobacco Use    Smoking status: Never    Smokeless tobacco: Never   Vaping Use    Vaping status: Every Day    Start date: 6/27/2019    Substances: Nicotine, Flavoring    Devices: Disposable   Substance Use Topics    Alcohol use: Yes     Comment: social    Drug use: Not Currently     Types: Marijuana, Inhaled     Comment: none in last 2 months          Problem list, medications, and allergies reviewed by myself today in Epic.     Objective:     /60 (BP Location: Left arm, Patient Position: Sitting, BP Cuff Size: Adult)   Pulse 84   Temp 36.8 °C (98.2 °F) (Temporal)   Resp 16   Ht 1.626 m (5' 4\")   Wt 52.2 kg (115 lb)   SpO2 97%   BMI 19.74 kg/m²     Physical Exam  Vitals and nursing note reviewed.   Constitutional:       Appearance: Normal appearance.   HENT:      Head: Normocephalic.   Cardiovascular:      Rate and Rhythm: " Normal rate and regular rhythm.      Heart sounds: Normal heart sounds.   Pulmonary:      Effort: Pulmonary effort is normal.      Breath sounds: Normal breath sounds.   Abdominal:      General: Bowel sounds are normal. There is no distension.      Palpations: Abdomen is soft.      Tenderness: There is no abdominal tenderness. There is no right CVA tenderness, left CVA tenderness, guarding or rebound.   Genitourinary:     Comments: Deferred  Neurological:      Mental Status: She is alert and oriented to person, place, and time.         Assessment/Plan:       Results for orders placed or performed in visit on 11/08/24   POCT Urinalysis    Collection Time: 11/08/24  2:08 PM   Result Value Ref Range    POC Color yellow Negative    POC Appearance cloudy Negative    POC Glucose negative Negative mg/dL    POC Bilirubin negative Negative mg/dL    POC Ketones negative Negative mg/dL    POC Specific Gravity 1.025 <1.005 - >1.030    POC Blood moderate Negative    POC Urine PH 6.5 5.0 - 8.0    POC Protein 30 Negative mg/dL    POC Urobiligen 0.2 Negative (0.2) mg/dL    POC Nitrites negative Negative    POC Leukocyte Esterase moderate Negative   POCT Pregnancy    Collection Time: 11/08/24  2:08 PM   Result Value Ref Range    POC Urine Pregnancy Test Negative     Internal Control Positive Positive     Internal Control Negative Negative        Diagnosis and associated orders:   1. Hematuria, unspecified type  - POCT Urinalysis  - POCT Pregnancy    2. Acute cystitis with hematuria  - nitrofurantoin (MACROBID) 100 MG Cap; Take 1 Capsule by mouth 2 times a day for 5 days.  Dispense: 10 Capsule; Refill: 0      Comments/MDM:       UA: +blood, +LE, -nitrite  Neg poc hcg  VSS. Afebrile. No cva tenderness  Recommend increased oral hydration, NSAID/[place] instructions  Return to urgent care symptoms fail to improve in 2 to 3 days for reevaluation  Follow-up with primary care advised       Return to clinic or go to ED if symptoms worsen  or persist. Indications for ED discussed at length. Patient/Parent/Guardian voices understanding. Follow-up with your primary care provider in 3-5 days. Red flag symptoms discussed. All side effects of medication discussed including allergic response, GI upset, tendon injury, rash, sedation etc.    Please note that this dictation was created using voice recognition software. I have made a reasonable attempt to correct obvious errors, but I expect that there are errors of grammar and possibly content that I did not discover before finalizing the note.    This note was electronically signed by RUBEN Mccormick

## 2024-11-08 NOTE — LETTER
November 8, 2024    To Whom It May Concern:         This is confirmation that Georgie Johnson attended her scheduled appointment with BRITTANY Valera on 11/08/24.    Please excuse patient from work today due to illness.         If you have any questions please do not hesitate to call me at the phone number listed below.    Sincerely,          RAMONITA Valera.  [phone]

## 2025-03-27 ENCOUNTER — OFFICE VISIT (OUTPATIENT)
Dept: URGENT CARE | Facility: CLINIC | Age: 30
End: 2025-03-27
Payer: COMMERCIAL

## 2025-03-27 VITALS
BODY MASS INDEX: 18.34 KG/M2 | RESPIRATION RATE: 20 BRPM | DIASTOLIC BLOOD PRESSURE: 84 MMHG | HEART RATE: 80 BPM | SYSTOLIC BLOOD PRESSURE: 118 MMHG | OXYGEN SATURATION: 97 % | WEIGHT: 107.4 LBS | TEMPERATURE: 100.9 F | HEIGHT: 64 IN

## 2025-03-27 DIAGNOSIS — J06.9 VIRAL URI WITH COUGH: ICD-10-CM

## 2025-03-27 PROCEDURE — 3074F SYST BP LT 130 MM HG: CPT | Performed by: STUDENT IN AN ORGANIZED HEALTH CARE EDUCATION/TRAINING PROGRAM

## 2025-03-27 PROCEDURE — 99214 OFFICE O/P EST MOD 30 MIN: CPT | Performed by: STUDENT IN AN ORGANIZED HEALTH CARE EDUCATION/TRAINING PROGRAM

## 2025-03-27 PROCEDURE — 3079F DIAST BP 80-89 MM HG: CPT | Performed by: STUDENT IN AN ORGANIZED HEALTH CARE EDUCATION/TRAINING PROGRAM

## 2025-03-27 ASSESSMENT — ENCOUNTER SYMPTOMS
COUGH: 1
CHILLS: 1
FEVER: 1

## 2025-03-27 ASSESSMENT — FIBROSIS 4 INDEX: FIB4 SCORE: 0.4

## 2025-03-27 NOTE — PROGRESS NOTES
"Subjective:   Georgie Johnson is a 30 y.o. female who presents for Cough, Fatigue, Headache, Nasal Congestion, Sore Throat, and Body Aches (Pt is experiencing body aches and chills )      HPI:  22-year-old female presents with cough fatigue headache congestion sore throat and bodyaches.  Roommate was sick with similar illness few days ago.  Her symptoms started 3 days ago.  She denies any significant shortness of breath.  She reports the cough is mildly productive.  She has no significant sinus pain or ear pain.  No rash no GI upset.  - Fever and chills for the past 2 days has not taken anything for these.            Review of Systems   Constitutional:  Positive for chills, fever and malaise/fatigue.   HENT:  Positive for congestion.    Respiratory:  Positive for cough.        Medications:    This patient does not have an active medication from one of the medication groupers.    Allergies: Patient has no known allergies.    Problem List: Georgie Johnson does not have any pertinent problems on file.    Surgical History:  No past surgical history on file.    Past Social Hx: Georgie Johnson  reports that she has never smoked. She has never used smokeless tobacco. She reports current alcohol use. She reports that she does not currently use drugs after having used the following drugs: Marijuana and Inhaled.     Past Family Hx:  Georgie Johnson family history includes Asthma in her brother; Cancer in her father; Heart Disease in her maternal grandmother; Hypertension in her maternal grandmother; No Known Problems in her brother; Thyroid in her mother.     Problem list, medications, and allergies reviewed by myself today in Epic.     Objective:     /84 (BP Location: Left arm, Patient Position: Sitting, BP Cuff Size: Adult)   Pulse 80   Temp (!) 38.3 °C (100.9 °F) (Temporal)   Resp 20   Ht 1.626 m (5' 4\")   Wt 48.7 kg (107 lb 6.4 oz)   SpO2 97%   BMI 18.44 kg/m²     Physical " Exam  Constitutional:       Appearance: Normal appearance.   HENT:      Head: Normocephalic and atraumatic.      Right Ear: Tympanic membrane normal.      Left Ear: Tympanic membrane normal.      Nose: Nose normal. No congestion or rhinorrhea.      Mouth/Throat:      Mouth: Mucous membranes are moist.      Pharynx: Oropharynx is clear.   Eyes:      Extraocular Movements: Extraocular movements intact.      Conjunctiva/sclera: Conjunctivae normal.   Cardiovascular:      Rate and Rhythm: Normal rate and regular rhythm.      Pulses: Normal pulses.      Heart sounds: Normal heart sounds.   Pulmonary:      Effort: Pulmonary effort is normal.      Breath sounds: Normal breath sounds.   Neurological:      Mental Status: She is alert.         Assessment/Plan:     Diagnosis and associated orders:     1. Viral URI with cough           Comments/MDM:     1. Viral URI with cough  Plan:   Likely viral upper respiratory infection (common cold, viral pharyngitis).  Symptoms include [nasal congestion, sore throat, cough, mild fever, headache, fatigue]. No evidence of bacterial infection (e.g., no severe sore throat, high fever, or tonsillar exudates).    Patient febrile on examination temperature 100.9 degrees.    Symptomatic Management     - Pain relief: Recommend acetaminophen or ibuprofen for fever and aches.      - Nasal congestion:      - Nasal saline irrigation or nasal spray (e.g., saline spray) as needed.     - If congestion is significant, consider pseudoephedrine 30 mg every 4-6 hours or nasal steroid spray such as Flonase     -  Cough relief: Continue cough suppressant (e.g., dextromethorphan) and/or an expectorant (e.g., guaifenesin) if productive.     -  Hydration: Advise increased fluid intake to help thin secretions and prevent dehydration.     - Throat comfort: Suggest throat lozenges, warm saltwater gargles, or warm teas with honey for soothing the throat.    Infection Control:     - Viral URIs are highly contagious,  especially in the first few days. Recommend avoiding close contact with others, including staying home from work or school until fever resolves and they feel better (typically after 3-5 days).    Follow-up:     - Symptom duration: Viral URIs typically resolve within 7-10 days. If symptoms worsen or persist longer than 10-14 days, please return for additional evaluation     - Follow-up sooner if you develop new or worsening symptoms such as severe sore throat, high fever, shortness of breath, or worsening cough.             Differential diagnosis, natural history, supportive care, and indications for immediate follow-up discussed.    Advised the patient to follow-up with the primary care physician for recheck, reevaluation, and consideration of further management.    Please note that this dictation was created using voice recognition software. I have made a reasonable attempt to correct obvious errors, but I expect that there are errors of grammar and possibly content that I did not discover before finalizing the note.    Shashi Nicolas M.D.

## 2025-03-27 NOTE — LETTER
March 27, 2025    To Whom It May Concern:         This is confirmation that Georgie Johnson attended her scheduled appointment with Shashi Nicolas M.D. on 3/27/25.May return to work on 3/31 if feeling improved and fevers have resolved. Please excuse from time missed.          If you have any questions please do not hesitate to call me at the phone number listed below.    Sincerely,          Shashi Nicolas M.D.  [phone]

## 2025-05-19 ENCOUNTER — OFFICE VISIT (OUTPATIENT)
Dept: MEDICAL GROUP | Facility: PHYSICIAN GROUP | Age: 30
End: 2025-05-19
Payer: COMMERCIAL

## 2025-05-19 VITALS
BODY MASS INDEX: 17.74 KG/M2 | HEART RATE: 85 BPM | DIASTOLIC BLOOD PRESSURE: 60 MMHG | WEIGHT: 103.9 LBS | OXYGEN SATURATION: 99 % | TEMPERATURE: 98.6 F | HEIGHT: 64 IN | SYSTOLIC BLOOD PRESSURE: 110 MMHG

## 2025-05-19 DIAGNOSIS — F32.2 CURRENT SEVERE EPISODE OF MAJOR DEPRESSIVE DISORDER WITHOUT PSYCHOTIC FEATURES WITHOUT PRIOR EPISODE (HCC): Primary | ICD-10-CM

## 2025-05-19 DIAGNOSIS — F41.1 GAD (GENERALIZED ANXIETY DISORDER): ICD-10-CM

## 2025-05-19 PROCEDURE — 3078F DIAST BP <80 MM HG: CPT | Performed by: NURSE PRACTITIONER

## 2025-05-19 PROCEDURE — 99214 OFFICE O/P EST MOD 30 MIN: CPT | Performed by: NURSE PRACTITIONER

## 2025-05-19 PROCEDURE — 3074F SYST BP LT 130 MM HG: CPT | Performed by: NURSE PRACTITIONER

## 2025-05-19 ASSESSMENT — ANXIETY QUESTIONNAIRES
GAD7 TOTAL SCORE: 17
6. BECOMING EASILY ANNOYED OR IRRITABLE: SEVERAL DAYS
4. TROUBLE RELAXING: NEARLY EVERY DAY
5. BEING SO RESTLESS THAT IT IS HARD TO SIT STILL: MORE THAN HALF THE DAYS
3. WORRYING TOO MUCH ABOUT DIFFERENT THINGS: MORE THAN HALF THE DAYS
1. FEELING NERVOUS, ANXIOUS, OR ON EDGE: NEARLY EVERY DAY
2. NOT BEING ABLE TO STOP OR CONTROL WORRYING: NEARLY EVERY DAY
7. FEELING AFRAID AS IF SOMETHING AWFUL MIGHT HAPPEN: NEARLY EVERY DAY

## 2025-05-19 ASSESSMENT — PATIENT HEALTH QUESTIONNAIRE - PHQ9
CLINICAL INTERPRETATION OF PHQ2 SCORE: 6
5. POOR APPETITE OR OVEREATING: 3 - NEARLY EVERY DAY
SUM OF ALL RESPONSES TO PHQ QUESTIONS 1-9: 24

## 2025-05-19 ASSESSMENT — FIBROSIS 4 INDEX: FIB4 SCORE: 0.4
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
## 2025-05-19 NOTE — ASSESSMENT & PLAN NOTE
SANDEE score today is 17.  Discussed and reviewed her SANDEE score today as well as her PHQ.  We discussed referral to psychiatry and medication.  She is in agreement to start medication.  Starting sertraline 50 mg daily.

## 2025-05-19 NOTE — LETTER
May 19, 2025    To Whom It May Concern:         This is confirmation that Georgie Johnson attended her scheduled appointment with BRITTANY Stewart on 5/19/25.         If you have any questions please do not hesitate to call me at the phone number listed below.    Sincerely,          Devika Bustamante APRN  [phone]

## 2025-05-19 NOTE — PROGRESS NOTES
"Subjective:     CC: stress    HPI:   Georgie presents today with the following:    Current severe episode of major depressive disorder without psychotic features without prior episode (HCC)  PHQ score today is 24. She has been seeing therapy. Over the last 6 months having increased work stress. Started with a co-worker challenging her character. She reported to her HR and since HR and upper management have not been helpful and her work environment is toxic. Her desk has nenita moved, more tasks added and recently accused of being drunk at work and drug use as she was wearing sunglasses and had weight loss. Notes she wears sunglasses for her eye due to nerve problem, see's eye doctor. For her weight loss she was recently ill and was seen in Urgent Care 3/27/2025. She has not been drug tested. She thinks she may have passed out 3 days ago in her apartment. She woke up and had a gash to her right eyebrow. She has a piece of skin missing but did not see blood. She lives alone. She was working on her plants. Therapy has recommended medication. Her sleep and diet is affected. She states she   feels like she is \"stepping on egg shells\" at work. She works in maintenance. Her boss texts here and messages her on Facebook. Feels like works is retaliating. She does not see psychiatry. Discussed referral and she is in agreement. Denies plan. Discussed medication and she is in agreement. Starting sertraline 50 mg daily.    SANDEE (generalized anxiety disorder)  SANDEE score today is 17.  Discussed and reviewed her SANDEE score today as well as her PHQ.  We discussed referral to psychiatry and medication.  She is in agreement to start medication.  Starting sertraline 50 mg daily.    Past Medical History[1]    Social History[2]    Current Medications and Prescriptions Ordered in Epic[3]    Allergies:  Patient has no allergy information on record.    Health Maintenance: Reviewed       Objective:     Vital signs reviewed  Exam:  /60 (BP " "Location: Left arm, Patient Position: Sitting, BP Cuff Size: Adult)   Pulse 85   Temp 37 °C (98.6 °F) (Temporal)   Ht 1.626 m (5' 4\")   Wt 47.1 kg (103 lb 14.4 oz)   LMP 05/05/2025 (Approximate)   SpO2 99%   BMI 17.83 kg/m²  Body mass index is 17.83 kg/m².    Gen: Alert and oriented, tearful during today's appointment. Good eye contact.   Neck: Neck is supple, full ROM.  Lungs: Normal effort, no audible wheezes  CV: Skin pink, warm and dry.  Ext: No clubbing, cyanosis, edema.      Assessment & Plan:     30 y.o. female with the following -     1. Current severe episode of major depressive disorder without psychotic features without prior episode (HCC) (Primary)  Chronic exacerbated problem.  Discussed and reviewed her PHQ score today.  We discussed checking updated labs and referral to psychiatry.  Starting sertraline 50 mg daily.  Continue follow-up with therapy.  We discussed that she may need to find a different job if this current work environment is affecting her mental and physical health.  - CBC WITH DIFFERENTIAL; Future  - Comp Metabolic Panel; Future  - TSH WITH REFLEX TO FT4; Future  - VITAMIN D,25 HYDROXY (DEFICIENCY); Future  - Referral to Behavioral Health  - Patient has been identified as having a positive depression screening. Appropriate orders and counseling have been given.  - sertraline (ZOLOFT) 50 MG Tab; Take 1 Tablet by mouth every day.  Dispense: 30 Tablet; Refill: 2    2. SANDEE (generalized anxiety disorder)  Chronic exacerbated problem.  Discussed and reviewed her SANDEE score today.  See #1 above.  - CBC WITH DIFFERENTIAL; Future  - Comp Metabolic Panel; Future  - TSH WITH REFLEX TO FT4; Future  - VITAMIN D,25 HYDROXY (DEFICIENCY); Future  - Referral to Behavioral Health  - sertraline (ZOLOFT) 50 MG Tab; Take 1 Tablet by mouth every day.  Dispense: 30 Tablet; Refill: 2      Return in about 4 weeks (around 6/16/2025) for anxiety, depression .    Please note that this dictation was created " using voice recognition software. I have made every reasonable attempt to correct obvious errors, but I expect that there are errors of grammar and possibly content that I did not discover before finalizing the note.             [1] No past medical history on file.  [2]   Social History  Tobacco Use    Smoking status: Never    Smokeless tobacco: Never   Vaping Use    Vaping status: Every Day    Start date: 6/27/2019    Substances: Nicotine, Flavoring    Devices: Disposable   Substance Use Topics    Alcohol use: Yes     Comment: social    Drug use: Not Currently     Types: Marijuana, Inhaled     Comment: none in last 2 months   [3]   Current Outpatient Medications Ordered in Epic   Medication Sig Dispense Refill    sertraline (ZOLOFT) 50 MG Tab Take 1 Tablet by mouth every day. 30 Tablet 2     No current Epic-ordered facility-administered medications on file.

## 2025-05-20 NOTE — Clinical Note
REFERRAL APPROVAL NOTICE         Sent on May 20, 2025                   Georgie Johnson  [address]  [place] [address]                   Dear Ms. Johnson,    After a careful review of the medical information and benefit coverage, [facility] has processed your referral. See below for additional details.    If applicable, you must be actively enrolled with your insurance for coverage of the authorized service. If you have any questions regarding your coverage, please contact your insurance directly.    REFERRAL INFORMATION   Referral #:  [id number]  Referred-To Provider    Referred-By Provider:  Behavioral Health    BRITTANY Stewart   [facility]      [address]  [place] [address]  [phone] [address]  [place] [address]  [phone]    Referral Start Date:  05/19/2025  Referral End Date:   05/19/2026             SCHEDULING  If you do not already have an appointment, please call [phone] to make an appointment.     MORE INFORMATION  If you do not already have a [facility] account, sign up at: [email or web address]  You can access your medical information, make appointments, see lab results, billing information, and more.  If you have questions regarding this referral, please contact  the [facility] Referrals department at:             [phone]. Monday - Friday 8:00AM - 5:00PM.     Sincerely,    [facility]

## 2025-05-27 ENCOUNTER — APPOINTMENT (OUTPATIENT)
Dept: LAB | Facility: MEDICAL CENTER | Age: 30
End: 2025-05-27
Payer: COMMERCIAL

## 2025-05-28 ENCOUNTER — HOSPITAL ENCOUNTER (OUTPATIENT)
Dept: LAB | Facility: MEDICAL CENTER | Age: 30
End: 2025-05-28
Attending: NURSE PRACTITIONER
Payer: COMMERCIAL

## 2025-05-28 DIAGNOSIS — F32.2 CURRENT SEVERE EPISODE OF MAJOR DEPRESSIVE DISORDER WITHOUT PSYCHOTIC FEATURES WITHOUT PRIOR EPISODE (HCC): ICD-10-CM

## 2025-05-28 DIAGNOSIS — F41.1 GAD (GENERALIZED ANXIETY DISORDER): ICD-10-CM

## 2025-05-28 LAB
25(OH)D3 SERPL-MCNC: 20 NG/ML (ref 30–100)
ALBUMIN SERPL BCP-MCNC: 4.3 G/DL (ref 3.2–4.9)
ALBUMIN/GLOB SERPL: 1.4 G/DL
ALP SERPL-CCNC: 57 U/L (ref 30–99)
ALT SERPL-CCNC: 31 U/L (ref 2–50)
ANION GAP SERPL CALC-SCNC: 10 MMOL/L (ref 7–16)
AST SERPL-CCNC: 34 U/L (ref 12–45)
BASOPHILS # BLD AUTO: 0.5 % (ref 0–1.8)
BASOPHILS # BLD: 0.03 K/UL (ref 0–0.12)
BILIRUB SERPL-MCNC: 0.4 MG/DL (ref 0.1–1.5)
BUN SERPL-MCNC: 10 MG/DL (ref 8–22)
CALCIUM ALBUM COR SERPL-MCNC: 9.3 MG/DL (ref 8.5–10.5)
CALCIUM SERPL-MCNC: 9.5 MG/DL (ref 8.5–10.5)
CHLORIDE SERPL-SCNC: 104 MMOL/L (ref 96–112)
CO2 SERPL-SCNC: 24 MMOL/L (ref 20–33)
CREAT SERPL-MCNC: 0.9 MG/DL (ref 0.5–1.4)
EOSINOPHIL # BLD AUTO: 0.04 K/UL (ref 0–0.51)
EOSINOPHIL NFR BLD: 0.6 % (ref 0–6.9)
ERYTHROCYTE [DISTWIDTH] IN BLOOD BY AUTOMATED COUNT: 49 FL (ref 35.9–50)
GFR SERPLBLD CREATININE-BSD FMLA CKD-EPI: 89 ML/MIN/1.73 M 2
GLOBULIN SER CALC-MCNC: 3.1 G/DL (ref 1.9–3.5)
GLUCOSE SERPL-MCNC: 94 MG/DL (ref 65–99)
HCT VFR BLD AUTO: 40.6 % (ref 37–47)
HGB BLD-MCNC: 13 G/DL (ref 12–16)
IMM GRANULOCYTES # BLD AUTO: 0.01 K/UL (ref 0–0.11)
IMM GRANULOCYTES NFR BLD AUTO: 0.2 % (ref 0–0.9)
LYMPHOCYTES # BLD AUTO: 2.37 K/UL (ref 1–4.8)
LYMPHOCYTES NFR BLD: 37.1 % (ref 22–41)
MCH RBC QN AUTO: 27.7 PG (ref 27–33)
MCHC RBC AUTO-ENTMCNC: 32 G/DL (ref 32.2–35.5)
MCV RBC AUTO: 86.6 FL (ref 81.4–97.8)
MONOCYTES # BLD AUTO: 0.48 K/UL (ref 0–0.85)
MONOCYTES NFR BLD AUTO: 7.5 % (ref 0–13.4)
NEUTROPHILS # BLD AUTO: 3.45 K/UL (ref 1.82–7.42)
NEUTROPHILS NFR BLD: 54.1 % (ref 44–72)
NRBC # BLD AUTO: 0 K/UL
NRBC BLD-RTO: 0 /100 WBC (ref 0–0.2)
PLATELET # BLD AUTO: 318 K/UL (ref 164–446)
PMV BLD AUTO: 9.3 FL (ref 9–12.9)
POTASSIUM SERPL-SCNC: 4.4 MMOL/L (ref 3.6–5.5)
PROT SERPL-MCNC: 7.4 G/DL (ref 6–8.2)
RBC # BLD AUTO: 4.69 M/UL (ref 4.2–5.4)
SODIUM SERPL-SCNC: 138 MMOL/L (ref 135–145)
TSH SERPL DL<=0.005 MIU/L-ACNC: 1.52 UIU/ML (ref 0.38–5.33)
WBC # BLD AUTO: 6.4 K/UL (ref 4.8–10.8)

## 2025-05-28 PROCEDURE — 82306 VITAMIN D 25 HYDROXY: CPT

## 2025-05-28 PROCEDURE — 85025 COMPLETE CBC W/AUTO DIFF WBC: CPT

## 2025-05-28 PROCEDURE — 80053 COMPREHEN METABOLIC PANEL: CPT

## 2025-05-28 PROCEDURE — 84443 ASSAY THYROID STIM HORMONE: CPT

## 2025-05-28 PROCEDURE — 36415 COLL VENOUS BLD VENIPUNCTURE: CPT

## 2025-06-02 ENCOUNTER — RESULTS FOLLOW-UP (OUTPATIENT)
Dept: MEDICAL GROUP | Facility: PHYSICIAN GROUP | Age: 30
End: 2025-06-02

## 2025-06-18 ENCOUNTER — OFFICE VISIT (OUTPATIENT)
Dept: URGENT CARE | Facility: CLINIC | Age: 30
End: 2025-06-18
Payer: COMMERCIAL

## 2025-06-18 VITALS
HEART RATE: 88 BPM | SYSTOLIC BLOOD PRESSURE: 110 MMHG | HEIGHT: 64 IN | OXYGEN SATURATION: 100 % | DIASTOLIC BLOOD PRESSURE: 70 MMHG | BODY MASS INDEX: 18.27 KG/M2 | WEIGHT: 107 LBS | TEMPERATURE: 97.6 F | RESPIRATION RATE: 15 BRPM

## 2025-06-18 DIAGNOSIS — R04.0 EPISTAXIS: Primary | ICD-10-CM

## 2025-06-18 PROCEDURE — 3078F DIAST BP <80 MM HG: CPT | Performed by: PHYSICIAN ASSISTANT

## 2025-06-18 PROCEDURE — 3074F SYST BP LT 130 MM HG: CPT | Performed by: PHYSICIAN ASSISTANT

## 2025-06-18 PROCEDURE — 99213 OFFICE O/P EST LOW 20 MIN: CPT | Performed by: PHYSICIAN ASSISTANT

## 2025-06-18 ASSESSMENT — ENCOUNTER SYMPTOMS
BRUISES/BLEEDS EASILY: 0
HEADACHES: 0
DIZZINESS: 0

## 2025-06-18 ASSESSMENT — FIBROSIS 4 INDEX: FIB4 SCORE: 0.54

## 2025-06-18 NOTE — PROGRESS NOTES
Subjective:     CHIEF COMPLAINT  Chief Complaint   Patient presents with    Epistaxis     3-5 nosebleeds in the last 3 days  Nosebleed last night       HPI  Georgie Johnson is a very pleasant 30 y.o. female who presents to the clinic after experiencing approximately 4 nosebleeds over the last 3 days.  She is unaware if she has noted bleeding out of both nostrils or if it has been 1 nostril each time.  Typically able to get bleeds controlled with compression in less than 5 minutes.  No preceding trauma.  She does deal with seasonal allergies and rhinitis.  Recent blood work was reviewed that returned reassuring without any signs of anemia or low platelets.  No prior history of clotting disorder.  No headaches, dizziness, nausea or vomiting.    REVIEW OF SYSTEMS  Review of Systems   Constitutional:  Negative for malaise/fatigue.   HENT:  Positive for congestion and nosebleeds.    Neurological:  Negative for dizziness and headaches.   Endo/Heme/Allergies:  Positive for environmental allergies. Does not bruise/bleed easily.       PAST MEDICAL HISTORY  Patient Active Problem List    Diagnosis Date Noted    Vitamin D insufficiency 07/22/2024    SANDEE (generalized anxiety disorder) 06/27/2024    Current severe episode of major depressive disorder without psychotic features without prior episode ([facility]) 06/27/2024       SURGICAL HISTORY  patient denies any surgical history    ALLERGIES  Allergies[1]    CURRENT MEDICATIONS  Home Medications       Reviewed by Gordo Lezama P.A.-C. (Physician Assistant) on 06/18/25 at 1020  Med List Status: <None>     Medication Last Dose Status   sertraline (ZOLOFT) 50 MG Tab Taking Active                    SOCIAL HISTORY  Social History     Tobacco Use    Smoking status: Never    Smokeless tobacco: Never   Vaping Use    Vaping status: Every Day    Start date: 6/27/2019    Substances: Nicotine, Flavoring    Devices: Disposable   Substance and Sexual Activity    Alcohol use: Yes      "Comment: social    Drug use: Yes     Types: Marijuana    Sexual activity: Yes     Partners: Male     Birth control/protection: None       FAMILY HISTORY  Family History   Problem Relation Age of Onset    Thyroid Mother         thyroid removal    Cancer Father         leukemia    Asthma Brother     No Known Problems Brother     Hypertension Maternal Grandmother     Heart Disease Maternal Grandmother           Objective:     VITAL SIGNS: /70   Pulse 88   Temp 36.4 °C (97.6 °F) (Temporal)   Resp 15   Ht 1.626 m (5' 4\")   Wt 48.5 kg (107 lb)   LMP 05/05/2025 (Approximate)   SpO2 100%   BMI 18.37 kg/m²     PHYSICAL EXAM  Physical Exam  Constitutional:       General: She is not in acute distress.     Appearance: Normal appearance. She is not ill-appearing, toxic-appearing or diaphoretic.   HENT:      Head: Normocephalic and atraumatic.      Nose: Nose normal. No nasal deformity, signs of injury or mucosal edema.      Right Nostril: No epistaxis, septal hematoma or occlusion.      Left Nostril: No epistaxis, septal hematoma or occlusion.      Comments: No nasal deformity.  Bilateral nares patent.  No septal deviation or hematoma present.  No active bleeding present.  Superficial vessels present to the Kiesselbach's plexus bilaterally.  Eyes:      Conjunctiva/sclera: Conjunctivae normal.   Musculoskeletal:      Cervical back: Normal range of motion.   Neurological:      Mental Status: She is alert.         Assessment/Plan:     1. Epistaxis      MDM/Comments:    Patient has no abnormality on exam.  Bleeding likely from Kiesselbach's plexus though no active bleeding at this time.  I believe likely secondary to rhinitis and dry climate.  Discussed applying topical Vaseline as a moisturizer.  Prior labs returned reassuring without any anemia or decreased platelets.    Differential diagnosis, natural history, supportive care, and indications for immediate follow-up discussed. All questions answered. Patient agrees " with the plan of care.    Follow-up as needed if symptoms worsen or fail to improve to PCP, Urgent care or Emergency Room.    I have personally reviewed prior external notes and test results pertinent to today's visit.  I have independently reviewed and interpreted all diagnostics ordered during this urgent care acute visit.   Discussed management options (risks,benefits, and alternatives to treatment). Pt expresses understanding and the treatment plan was agreed upon. Questions were encouraged and answered to pt's satisfaction.    Please note that this dictation was created using voice recognition software. I have made a reasonable attempt to correct obvious errors, but I expect that there are errors of grammar and possibly content that I did not discover before finalizing the note.       [1] No Known Allergies

## 2025-06-18 NOTE — LETTER
June 18, 2025    To Whom It May Concern:         This is confirmation that Georgie Johnson was seen by Gordo Lezama P.A.-C. on 6/18/25.  Please excuse her absence from work on 6/18/2025.         If you have any questions please do not hesitate to call me at the phone number listed below.    Sincerely,          Gordo Lezama P.A.-C.  [phone]

## 2025-06-23 ENCOUNTER — APPOINTMENT (OUTPATIENT)
Dept: RADIOLOGY | Facility: IMAGING CENTER | Age: 30
End: 2025-06-23
Payer: COMMERCIAL

## 2025-06-23 ENCOUNTER — OFFICE VISIT (OUTPATIENT)
Dept: URGENT CARE | Facility: CLINIC | Age: 30
End: 2025-06-23
Payer: COMMERCIAL

## 2025-06-23 VITALS
BODY MASS INDEX: 18.27 KG/M2 | WEIGHT: 107 LBS | OXYGEN SATURATION: 97 % | HEART RATE: 83 BPM | TEMPERATURE: 97.6 F | RESPIRATION RATE: 18 BRPM | DIASTOLIC BLOOD PRESSURE: 78 MMHG | HEIGHT: 64 IN | SYSTOLIC BLOOD PRESSURE: 114 MMHG

## 2025-06-23 DIAGNOSIS — S96.912A OVERUSE SYNDROME OF FOOT, LEFT, INITIAL ENCOUNTER: ICD-10-CM
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
## 2025-06-23 DIAGNOSIS — M79.672 LEFT FOOT PAIN: Primary | ICD-10-CM

## 2025-06-23 DIAGNOSIS — M79.672 LEFT FOOT PAIN: ICD-10-CM

## 2025-06-23 PROCEDURE — 3078F DIAST BP <80 MM HG: CPT

## 2025-06-23 PROCEDURE — 99214 OFFICE O/P EST MOD 30 MIN: CPT

## 2025-06-23 PROCEDURE — 73630 X-RAY EXAM OF FOOT: CPT | Mod: TC,LT | Performed by: RADIOLOGY

## 2025-06-23 PROCEDURE — 3074F SYST BP LT 130 MM HG: CPT

## 2025-06-23 ASSESSMENT — FIBROSIS 4 INDEX: FIB4 SCORE: 0.54

## 2025-06-23 NOTE — PROGRESS NOTES
"Subjective:   Georgie Johnson is a 30 y.o. female who presents for Foot Problem (L foot on the side and the bottom of her foot are in pain, hurts to put on weight, was swollen.)    Patient presents to the clinic for complaints of left foot pain x 2 days.  Left foot pain on the lateral aspect as well as the plantar aspect after hiking 12 miles this weekend. Pain level 6/10, dull in nature.   Pain aggravated by weightbearing to the left foot.   Previously had swelling, but has resolved. Mild amount of bruising to the dorsal aspect of the foot.   No trauma to the foot.   This has occurred in the past after a long day of hiking. Patient is a very avid hiker.  Has taken no meds for the pain.   Patient denies chest pain, SOB, dizziness/lightheadedness/vertigo, nausea/vomiting/diarrhea, difficulty breathing or swallowing, palpitations or racing heart rate, numbness/tingling, loss of ROM, or loss of strength.       Foot Problem        ROS  Refer to [facility] for additional details.    During this visit, appropriate PPE was worn, and hand hygiene was performed.    PMH:  has no past medical history on file.    MEDS: Current Medications[1]    ALLERGIES: Allergies[2]  SURGHX: Past Surgical History[3]  SOCHX:  reports that she has never smoked. She has never used smokeless tobacco. She reports current alcohol use. She reports current drug use. Drug: Marijuana.    FH: Per [facility] as applicable/pertinent.    Medications, Allergies, and current problem list reviewed today in Epic.     Objective:     /78   Pulse 83   Temp 36.4 °C (97.6 °F) (Temporal)   Resp 18   Ht 1.626 m (5' 4\")   Wt 48.5 kg (107 lb)   SpO2 97%     Physical Exam  Constitutional:       Appearance: Normal appearance.   HENT:      Head: Normocephalic.      Right Ear: External ear normal.      Left Ear: External ear normal.      Nose: Nose normal.      Mouth/Throat:      Mouth: Mucous membranes are moist.   Eyes:      Extraocular Movements: Extraocular " movements intact.      Conjunctiva/sclera: Conjunctivae normal.   Cardiovascular:      Rate and Rhythm: Normal rate and regular rhythm.      Pulses: Normal pulses.   Pulmonary:      Effort: Pulmonary effort is normal.   Abdominal:      General: Abdomen is flat.   Musculoskeletal:         General: Normal range of motion.        Feet:       Comments: Lateral aspect of the plantar area of the left midfoot with mild TTP as highlighted in the image above.  Mild amount of bruising to the dorsal aspect of the left midfoot.  No swelling, erythema, or deformity on examination.  Slightly antalgic gait due to left foot pain.  Left foot with full ROM and 5/5 strength.  Cap refill less than 2 seconds, pedal pulse 2+.   Skin:     General: Skin is warm and dry.      Capillary Refill: Capillary refill takes less than 2 seconds.      Findings: Bruising (top of left foot) present.   Neurological:      General: No focal deficit present.      Mental Status: She is alert and oriented to person, place, and time.      Gait: Gait abnormal (preferring right foot for weightbearing due to left foot pain).   Psychiatric:         Mood and Affect: Mood normal.         Behavior: Behavior normal.         Thought Content: Thought content normal.         Judgment: Judgment normal.       DX-FOOT-COMPLETE 3+ LEFT  Result Date: 6/23/2025 6/23/2025 3:57 PM HISTORY/REASON FOR EXAM:  Pain/Deformity Following Trauma TECHNIQUE/EXAM DESCRIPTION AND NUMBER OF VIEWS: 3 views of the LEFT foot. COMPARISON:  None. FINDINGS:  No acute fracture is noted. There is no dislocation.  No bone erosion is noted.     No acute fracture or dislocation is noted.    Assessment/Plan:     Diagnosis and associated orders:     1. Left foot pain  - DX-FOOT-COMPLETE 3+ LEFT; Future  - Referral to Podiatry    2. Overuse syndrome of foot, left, initial encounter  - Referral to Podiatry     Comments/MDM:     Reviewed x-ray report with patient, which shows no acute fracture or  dislocation involving the left foot. Imaging results and images personally reviewed by this provider as well.  Discussed that likely etiology of the patient's symptoms is left foot pain secondary to overuse from hiking.  Instructed patient to utilize Tylenol, Motrin, ice therapy, rest, elevation, gentle ROM stretching for her symptoms.   Referral to podiatry placed for the patient for further management of the patient's left foot symptoms related to her avid hiking lifestyle and consideration for orthotics and recommendations for proper foot wear.  Patient agreeable to this plan of care.  All questions answered.  Return to clinic if symptoms persist or worsen.  Red flag symptoms warranting emergency medical services discussed, including but not limited to chest pain, SOB, wheezing, difficulty breathing or swallowing, sensation of throat closing or mass in the throat, severe intractable headache, changes to vision or hearing, palpitations or racing heart rate, abdominal pain, fever greater than 103F despite medication management, numbness, tingling, loss of range of motion, worsened swelling, dizziness/lightheadedness/vertigo, or nausea/vomiting/diarrhea.           Differential diagnosis, natural history, supportive care, and indications for immediate follow-up discussed.    Advised the patient to follow-up with the primary care physician for recheck, reevaluation, and consideration of further management.    Instructed patient to seek emergency medical attention via calling EMS or going to the Emergency Room for red flag symptoms, including but not limited to: chest pain, palpitations, fever greater than 103F, shortness of breath, wheezing, new or worsened numbness/tingling, focal or unilateral weakness, and bloody vomit/stool.     Please note that this dictation was created using voice recognition software. I have made a reasonable attempt to correct obvious errors, but I expect that there are errors of grammar and  possibly content that I did not discover before finalizing the note.    This note was electronically signed by BRITTANY Hooper           [1]   Current Outpatient Medications:     sertraline (ZOLOFT) 50 MG Tab, Take 1 Tablet by mouth every day., Disp: 30 Tablet, Rfl: 2  [2] No Known Allergies  [3] History reviewed. No pertinent surgical history.

## 2025-06-27 NOTE — Clinical Note
REFERRAL APPROVAL NOTICE         Sent on June 27, 2025                   Georgie Johnson  [address]  [place] [address]                   Dear Ms. Johnson,    After a careful review of the medical information and benefit coverage, [facility] has processed your referral. See below for additional details.    If applicable, you must be actively enrolled with your insurance for coverage of the authorized service. If you have any questions regarding your coverage, please contact your insurance directly.    REFERRAL INFORMATION   Referral #:  [id number]  Referred-To Provider    Referred-By Provider:  Podiatry    BRITTANY Hooper JOSEPH F      [address]  [address]  [place] [address]  [phone] [address]  [place] [address]  [phone]    Referral Start Date:  06/23/2025  Referral End Date:   06/23/2026             SCHEDULING  If you do not already have an appointment, please call [phone] to make an appointment.     MORE INFORMATION  If you do not already have a [employer] account, sign up at: [email or web address]  You can access your medical information, make appointments, see lab results, billing information, and more.  If you have questions regarding this referral, please contact  the [facility] Referrals department at:             [phone]. Monday - Friday 8:00AM - 5:00PM.     Sincerely,    [facility]

## 2025-07-23 ENCOUNTER — APPOINTMENT (OUTPATIENT)
Dept: MEDICAL GROUP | Facility: PHYSICIAN GROUP | Age: 30
End: 2025-07-23
Payer: COMMERCIAL

## 2025-08-21 DIAGNOSIS — F32.2 CURRENT SEVERE EPISODE OF MAJOR DEPRESSIVE DISORDER WITHOUT PSYCHOTIC FEATURES WITHOUT PRIOR EPISODE (HCC): ICD-10-CM

## 2025-08-21 DIAGNOSIS — F41.1 GAD (GENERALIZED ANXIETY DISORDER): ICD-10-CM

## 2025-08-28 ENCOUNTER — OFFICE VISIT (OUTPATIENT)
Dept: URGENT CARE | Facility: CLINIC | Age: 30
End: 2025-08-28
Payer: COMMERCIAL

## 2025-08-28 VITALS
WEIGHT: 114.8 LBS | DIASTOLIC BLOOD PRESSURE: 72 MMHG | HEART RATE: 88 BPM | RESPIRATION RATE: 18 BRPM | HEIGHT: 64 IN | TEMPERATURE: 97.7 F | OXYGEN SATURATION: 97 % | SYSTOLIC BLOOD PRESSURE: 110 MMHG | BODY MASS INDEX: 19.6 KG/M2

## 2025-08-28 DIAGNOSIS — F41.9 ANXIOUSNESS: Primary | ICD-10-CM

## 2025-08-28 DIAGNOSIS — Z56.6 STRESS AT WORK: ICD-10-CM

## 2025-08-28 PROCEDURE — 3078F DIAST BP <80 MM HG: CPT

## 2025-08-28 PROCEDURE — 99213 OFFICE O/P EST LOW 20 MIN: CPT

## 2025-08-28 PROCEDURE — 3074F SYST BP LT 130 MM HG: CPT

## 2025-08-28 SDOH — HEALTH STABILITY - MENTAL HEALTH: OTHER PHYSICAL AND MENTAL STRAIN RELATED TO WORK: Z56.6

## 2025-08-28 ASSESSMENT — LIFESTYLE VARIABLES
HOW OFTEN DO YOU HAVE SIX OR MORE DRINKS ON ONE OCCASION: LESS THAN MONTHLY
AUDIT-C TOTAL SCORE: 2
SKIP TO QUESTIONS 9-10: 0
HOW OFTEN DO YOU HAVE A DRINK CONTAINING ALCOHOL: MONTHLY OR LESS
HOW MANY STANDARD DRINKS CONTAINING ALCOHOL DO YOU HAVE ON A TYPICAL DAY: 1 OR 2

## 2025-08-28 ASSESSMENT — ENCOUNTER SYMPTOMS: NERVOUS/ANXIOUS: 1

## 2025-08-28 ASSESSMENT — FIBROSIS 4 INDEX: FIB4 SCORE: 0.54
